# Patient Record
Sex: MALE | Race: WHITE | NOT HISPANIC OR LATINO | Employment: FULL TIME | URBAN - METROPOLITAN AREA
[De-identification: names, ages, dates, MRNs, and addresses within clinical notes are randomized per-mention and may not be internally consistent; named-entity substitution may affect disease eponyms.]

---

## 2017-08-23 ENCOUNTER — OFFICE VISIT (OUTPATIENT)
Dept: ORTHOPEDICS | Facility: CLINIC | Age: 22
End: 2017-08-23
Attending: ORTHOPAEDIC SURGERY
Payer: COMMERCIAL

## 2017-08-23 ENCOUNTER — HOSPITAL ENCOUNTER (OUTPATIENT)
Dept: RADIOLOGY | Facility: HOSPITAL | Age: 22
Discharge: HOME | End: 2017-08-23
Attending: ORTHOPAEDIC SURGERY
Payer: COMMERCIAL

## 2017-08-23 ENCOUNTER — TELEPHONE (OUTPATIENT)
Dept: ORTHOPEDICS | Facility: CLINIC | Age: 22
End: 2017-08-23

## 2017-08-23 VITALS — HEIGHT: 71 IN | BODY MASS INDEX: 44.1 KG/M2 | WEIGHT: 315 LBS

## 2017-08-23 DIAGNOSIS — M25.561 ACUTE BILATERAL KNEE PAIN: ICD-10-CM

## 2017-08-23 DIAGNOSIS — M25.562 ACUTE BILATERAL KNEE PAIN: ICD-10-CM

## 2017-08-23 DIAGNOSIS — M25.562 ACUTE BILATERAL KNEE PAIN: Primary | ICD-10-CM

## 2017-08-23 DIAGNOSIS — M25.562 ACUTE PAIN OF LEFT KNEE: Primary | ICD-10-CM

## 2017-08-23 DIAGNOSIS — M25.561 ACUTE BILATERAL KNEE PAIN: Primary | ICD-10-CM

## 2017-08-23 PROCEDURE — 73564 X-RAY EXAM KNEE 4 OR MORE: CPT | Mod: TC,50

## 2017-08-23 PROCEDURE — 73564 X-RAY EXAM KNEE 4 OR MORE: CPT | Mod: 26,50,, | Performed by: RADIOLOGY

## 2017-08-23 PROCEDURE — 99999 PR PBB SHADOW E&M-EST. PATIENT-LVL III: CPT | Mod: PBBFAC,,, | Performed by: PHYSICIAN ASSISTANT

## 2017-08-23 PROCEDURE — 3008F BODY MASS INDEX DOCD: CPT | Mod: S$GLB,,, | Performed by: PHYSICIAN ASSISTANT

## 2017-08-23 PROCEDURE — 99203 OFFICE O/P NEW LOW 30 MIN: CPT | Mod: S$GLB,,, | Performed by: PHYSICIAN ASSISTANT

## 2017-08-23 NOTE — TELEPHONE ENCOUNTER
Called both numbers to inform patient of x-rays to be completed prior to visit. Patient and patient's mother's phones were not accepting call at this time. Unable to leave message.

## 2017-08-24 NOTE — PROGRESS NOTES
SUBJECTIVE:     Chief Complaint & History of Present Illness:  Eduin Ugarte is a New patient 22 y.o. male who is seen here today with a complaint of    Chief Complaint   Patient presents with    Left Knee - Pain    . Reports he sufferd a fall and immediately developed pain and tenderness in the medial aspect of his left knee. He has had multiple episodes of locking and giving way since fall. Was seen a Urgent care x-rays demonstrate no evidence of fracture or dislocation. He does have a positive History of meniscal tear in that knee 3 years ago. And underwent arthroscopic repair  On a scale of 1-10, with 10 being worst pain imaginable, he rates this pain as 7 on good days and 10 on bad days.  he describes the pain as tender and sore.    Review of patient's allergies indicates:  No Known Allergies      Current Outpatient Prescriptions   Medication Sig Dispense Refill    hydrocodone-acetaminophen 7.5-325mg (NORCO) 7.5-325 mg per tablet Take 1 tablet by mouth every 6 (six) hours as needed for Pain.      ibuprofen (ADVIL,MOTRIN) 600 MG tablet Take 1 tablet (600 mg total) by mouth 3 (three) times daily. 60 tablet 0    oxycodone-acetaminophen (PERCOCET) 5-325 mg per tablet Take 1 tablet by mouth every 6 (six) hours as needed for Pain. 50 tablet 0    promethazine (PHENERGAN) 25 MG tablet Take 1 tablet (25 mg total) by mouth every 8 (eight) hours as needed for Nausea. 30 tablet 0     No current facility-administered medications for this visit.        Past Medical History:   Diagnosis Date    Asthma        History reviewed. No pertinent surgical history.    Vital Signs (Most Recent)  There were no vitals filed for this visit.        Review of Systems:  ROS:  Constitutional: no fever or chills  Eyes: no visual changes  ENT: no nasal congestion or sore throat  Respiratory: no cough or shortness of breath  Cardiovascular: no chest pain or palpitations  Gastrointestinal: no nausea or vomiting, tolerating  "diet  Genitourinary: no hematuria or dysuria  Integument/Breast: no rash or pruritis  Hematologic/Lymphatic: no easy bruising or lymphadenopathy  Musculoskeletal: no arthralgias or myalgias  Neurological: no seizures or tremors  Behavioral/Psych: no auditory or visual hallucinations  Endocrine: no heat or cold intolerance                OBJECTIVE:     PHYSICAL EXAM:  Height: 5' 11" (180.3 cm) Weight: (!) 158.8 kg (350 lb 1.5 oz), General Appearance: Well nourished, well developed, in no acute distress.  Neurological: Mood & affect are normal.  left Knee Exam:  Knee Range of Motion:0-100 degrees flexion   Effusion:yes  Condition of skin:intact  Location of tenderness:Medial joint line and Patella   Strength:limited by pain  Stability:  stable to testing, Lachman: stable, LCL: stable, MCL: stable and PCL: stable  Varus /Valgus stress:  normal  Hunter:   Positive Medial/Positive Medial    right Knee Exam:  Knee Range of Motion:0-120 degrees flexion   Effusion:none  Condition of skin:intact  Location of tenderness:None   Strength:5 of 5  Stability:  stable to testing  Varus /Valgus stress:  normal  Hunter:   negative/negative      Hip Examination:  normal    RADIOGRAPHS:  Taken today films reviewed by me demonstrate no fracture dilocation or advanced DJD    ASSESSMENT/PLAN:     Plan: We discussed with the patient at length all the different treatment options available for  the knee including anti-inflammatories, acetaminophen, rest, ice, knee strengthening exercise, occasional cortisone injections for temporary relief, Viscosupplimentation injections, arthroscopic debridement osteotomy, and finally knee arthroplasty.   In light of positive exam we will proceed with MRI of left knee  Knee immobilizer , crutch walking WBAT  continue pain medication prescribed and Mobic.   Follow up after MRI to discuss treatment options  "

## 2017-08-30 ENCOUNTER — HOSPITAL ENCOUNTER (OUTPATIENT)
Dept: RADIOLOGY | Facility: HOSPITAL | Age: 22
Discharge: HOME | End: 2017-08-30
Attending: PHYSICIAN ASSISTANT
Payer: COMMERCIAL

## 2017-08-30 DIAGNOSIS — M25.562 ACUTE PAIN OF LEFT KNEE: ICD-10-CM

## 2017-08-30 PROCEDURE — 73721 MRI JNT OF LWR EXTRE W/O DYE: CPT | Mod: TC,LT

## 2017-08-30 PROCEDURE — 73721 MRI JNT OF LWR EXTRE W/O DYE: CPT | Mod: 26,LT,, | Performed by: RADIOLOGY

## 2017-09-11 ENCOUNTER — OFFICE VISIT (OUTPATIENT)
Dept: ORTHOPEDICS | Facility: CLINIC | Age: 22
End: 2017-09-11
Payer: COMMERCIAL

## 2017-09-11 VITALS — WEIGHT: 315 LBS | BODY MASS INDEX: 42.66 KG/M2 | HEIGHT: 72 IN

## 2017-09-11 DIAGNOSIS — M25.562 ACUTE PAIN OF LEFT KNEE: Primary | ICD-10-CM

## 2017-09-11 PROCEDURE — 20610 DRAIN/INJ JOINT/BURSA W/O US: CPT | Mod: LT,S$GLB,, | Performed by: PHYSICIAN ASSISTANT

## 2017-09-11 PROCEDURE — 99999 PR PBB SHADOW E&M-EST. PATIENT-LVL III: CPT | Mod: PBBFAC,,, | Performed by: PHYSICIAN ASSISTANT

## 2017-09-11 PROCEDURE — 3008F BODY MASS INDEX DOCD: CPT | Mod: S$GLB,,, | Performed by: PHYSICIAN ASSISTANT

## 2017-09-11 PROCEDURE — 99213 OFFICE O/P EST LOW 20 MIN: CPT | Mod: 25,S$GLB,, | Performed by: PHYSICIAN ASSISTANT

## 2017-09-11 RX ORDER — BETAMETHASONE SODIUM PHOSPHATE AND BETAMETHASONE ACETATE 3; 3 MG/ML; MG/ML
6 INJECTION, SUSPENSION INTRA-ARTICULAR; INTRALESIONAL; INTRAMUSCULAR; SOFT TISSUE
Status: COMPLETED | OUTPATIENT
Start: 2017-09-11 | End: 2017-09-11

## 2017-09-11 RX ADMIN — BETAMETHASONE SODIUM PHOSPHATE AND BETAMETHASONE ACETATE 6 MG: 3; 3 INJECTION, SUSPENSION INTRA-ARTICULAR; INTRALESIONAL; INTRAMUSCULAR; SOFT TISSUE at 06:09

## 2017-10-16 ENCOUNTER — OFFICE VISIT (OUTPATIENT)
Dept: FAMILY MEDICINE | Facility: CLINIC | Age: 22
End: 2017-10-16
Payer: COMMERCIAL

## 2017-10-16 VITALS
BODY MASS INDEX: 44.1 KG/M2 | HEART RATE: 113 BPM | DIASTOLIC BLOOD PRESSURE: 88 MMHG | SYSTOLIC BLOOD PRESSURE: 128 MMHG | HEIGHT: 71 IN | WEIGHT: 315 LBS | TEMPERATURE: 99 F | OXYGEN SATURATION: 97 %

## 2017-10-16 DIAGNOSIS — F32.A DEPRESSION, UNSPECIFIED DEPRESSION TYPE: Primary | ICD-10-CM

## 2017-10-16 DIAGNOSIS — T74.21XA CONFIRMED VICTIM OF SEXUAL ABUSE IN ADULTHOOD, INITIAL ENCOUNTER: ICD-10-CM

## 2017-10-16 PROCEDURE — 99213 OFFICE O/P EST LOW 20 MIN: CPT | Mod: S$GLB,,, | Performed by: FAMILY MEDICINE

## 2017-10-16 RX ORDER — METHYLPREDNISOLONE 4 MG/1
TABLET ORAL
COMMUNITY
Start: 2017-10-10 | End: 2017-10-16

## 2017-10-16 RX ORDER — HYDROCODONE BITARTRATE AND ACETAMINOPHEN 5; 325 MG/1; MG/1
1 TABLET ORAL
Refills: 0 | COMMUNITY
Start: 2017-08-23 | End: 2017-10-16

## 2017-10-16 RX ORDER — ALBUTEROL SULFATE 90 UG/1
AEROSOL, METERED RESPIRATORY (INHALATION)
COMMUNITY
Start: 2017-10-10 | End: 2017-10-16

## 2017-10-16 RX ORDER — AZITHROMYCIN 250 MG/1
TABLET, FILM COATED ORAL
COMMUNITY
Start: 2017-10-10 | End: 2017-10-16

## 2017-10-16 RX ORDER — MELOXICAM 15 MG/1
15 TABLET ORAL DAILY
Refills: 0 | COMMUNITY
Start: 2017-08-23 | End: 2017-10-16

## 2017-10-16 RX ORDER — PROMETHAZINE HYDROCHLORIDE AND DEXTROMETHORPHAN HYDROBROMIDE 6.25; 15 MG/5ML; MG/5ML
SYRUP ORAL
COMMUNITY
Start: 2017-10-10 | End: 2017-10-16

## 2017-10-17 ENCOUNTER — TELEPHONE (OUTPATIENT)
Dept: FAMILY MEDICINE | Facility: CLINIC | Age: 22
End: 2017-10-17

## 2017-10-17 RX ORDER — SERTRALINE HYDROCHLORIDE 50 MG/1
50 TABLET, FILM COATED ORAL DAILY
Qty: 30 TABLET | Refills: 11 | Status: SHIPPED | OUTPATIENT
Start: 2017-10-17 | End: 2019-06-17

## 2017-10-17 NOTE — TELEPHONE ENCOUNTER
----- Message from Teresa Novoa sent at 10/17/2017  1:37 PM CDT -----  Contact: mother  Mother called requesting a prescription for zoloft to be sent to [facility].       Dr Mendiola, I am not sure if you know this pt. I don't see Zoloft on his med list, even past meds.   I have not spoken with the pt or mom  Patient saw you yesterday
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
## 2019-03-07 ENCOUNTER — TELEPHONE (OUTPATIENT)
Dept: FAMILY MEDICINE | Facility: CLINIC | Age: 24
End: 2019-03-07

## 2019-03-07 NOTE — TELEPHONE ENCOUNTER
----- Message from Marilyn Berkowitz sent at 3/7/2019  9:49 AM CST -----  Contact: [phone] /self  Patient is requesting a call back regarding having paperwork completed to return to work.  Thanks

## 2019-03-07 NOTE — TELEPHONE ENCOUNTER
Patient went to Urgent care dx with flu and was given excuse but has paperwork to be completed from work to rtn to work   Out 2/28/19 - 3/6/19  He has excuse from urgent care but they paper work has to be completed by his PCP    Could he drop off the paper work and you sign it - he said it is a simple form releasing him to rtn to work?  Please advise today

## 2019-06-17 ENCOUNTER — OFFICE VISIT (OUTPATIENT)
Dept: FAMILY MEDICINE | Facility: CLINIC | Age: 24
End: 2019-06-17
Payer: COMMERCIAL

## 2019-06-17 VITALS
HEART RATE: 72 BPM | DIASTOLIC BLOOD PRESSURE: 70 MMHG | HEIGHT: 71 IN | SYSTOLIC BLOOD PRESSURE: 108 MMHG | TEMPERATURE: 98 F | BODY MASS INDEX: 40.37 KG/M2 | OXYGEN SATURATION: 98 % | WEIGHT: 288.38 LBS

## 2019-06-17 DIAGNOSIS — Z23 NEED FOR TDAP VACCINATION: ICD-10-CM

## 2019-06-17 DIAGNOSIS — Z00.00 ROUTINE HEALTH MAINTENANCE: Primary | ICD-10-CM

## 2019-06-17 DIAGNOSIS — Z23 NEED FOR PNEUMOCOCCAL VACCINATION: ICD-10-CM

## 2019-06-17 DIAGNOSIS — F32.A DEPRESSION, UNSPECIFIED DEPRESSION TYPE: ICD-10-CM

## 2019-06-17 PROCEDURE — 90715 TDAP VACCINE GREATER THAN OR EQUAL TO 7YO IM: ICD-10-PCS | Mod: S$GLB,,, | Performed by: FAMILY MEDICINE

## 2019-06-17 PROCEDURE — 99395 PREV VISIT EST AGE 18-39: CPT | Mod: 25,S$GLB,, | Performed by: FAMILY MEDICINE

## 2019-06-17 PROCEDURE — 90732 PPSV23 VACC 2 YRS+ SUBQ/IM: CPT | Mod: S$GLB,,, | Performed by: FAMILY MEDICINE

## 2019-06-17 PROCEDURE — 90471 PNEUMOCOCCAL POLYSACCHARIDE VACCINE 23-VALENT =>2YO SQ IM: ICD-10-PCS | Mod: S$GLB,,, | Performed by: FAMILY MEDICINE

## 2019-06-17 PROCEDURE — 90715 TDAP VACCINE 7 YRS/> IM: CPT | Mod: S$GLB,,, | Performed by: FAMILY MEDICINE

## 2019-06-17 PROCEDURE — 90732 PNEUMOCOCCAL POLYSACCHARIDE VACCINE 23-VALENT =>2YO SQ IM: ICD-10-PCS | Mod: S$GLB,,, | Performed by: FAMILY MEDICINE

## 2019-06-17 PROCEDURE — 90472 IMMUNIZATION ADMIN EACH ADD: CPT | Mod: S$GLB,,, | Performed by: FAMILY MEDICINE

## 2019-06-17 PROCEDURE — 99395 PR PREVENTIVE VISIT,EST,18-39: ICD-10-PCS | Mod: 25,S$GLB,, | Performed by: FAMILY MEDICINE

## 2019-06-17 PROCEDURE — 90471 IMMUNIZATION ADMIN: CPT | Mod: S$GLB,,, | Performed by: FAMILY MEDICINE

## 2019-06-17 PROCEDURE — 90472 TDAP VACCINE GREATER THAN OR EQUAL TO 7YO IM: ICD-10-PCS | Mod: S$GLB,,, | Performed by: FAMILY MEDICINE

## 2019-06-17 RX ORDER — SERTRALINE HYDROCHLORIDE 100 MG/1
100 TABLET, FILM COATED ORAL DAILY
Qty: 90 TABLET | Refills: 3 | Status: SHIPPED | OUTPATIENT
Start: 2019-06-17 | End: 2019-07-24

## 2019-06-18 NOTE — PROGRESS NOTES
" Patient ID: Eduin Ugarte is a 24 y.o. male.    Chief Complaint: Depression    HPI      Eduin Ugarte is a 24 y.o. male. here for annual exam.   Complains of continued depressive symptoms occasionally.  Patient with pros to Magic stress disorder.  Discussed need to see counselor.  Would like to increase medication from  mg.        Review of Symptoms    Constitutional: Negative.    HENT: Negative.    Eyes: Negative.    Respiratory: Negative.    Cardiovascular: Negative.    Gastrointestinal: Negative.    Endocrine: Negative.    Genitourinary: Negative.    Musculoskeletal: Negative.    Skin: Negative.    Allergic/Immunologic: Negative.    Neurological: Negative.    Hematological: Negative.    Psychiatric/Behavioral: Negative.      Except as above in HPI      Vitals:    06/17/19 0816   BP: 108/70   BP Location: Left arm   Patient Position: Sitting   Pulse: 72   Temp: 98.2 °F (36.8 °C)   TempSrc: Oral   SpO2: 98%   Weight: 130.8 kg (288 lb 5.8 oz)   Height: 5' 11" (1.803 m)        Physical  Exam      Constitutional:  Oriented to person, place, and time. Appears well-developed and well-nourished.     HENT:   Head: Normocephalic and atraumatic.     Right Ear: Tympanic membrane, ear canal and External ear normal     Left Ear: Tympanic membrane, ear canal and External ear normal     Nose: Nose normal. No rhinorrhea or nasal deformity.     Mouth/Throat: Uvula is midline, oropharynx is clear and moist and mucous membranes are normal.      Eyes: Conjunctivae are normal. Right eye exhibits no discharge. Left eye exhibits no discharge. No scleral icterus.     Neck:  No JVD present. No tracheal deviation  []  Neck supple.   []  No Carotid bruit    Cardiovascular:  Regular rate and rhythm with normal S1 and S2     Pulmonary/Chest:   Clear to auscultation bilaterally without wheezes, rhonchi or rales    Musculoskeletal: Normal range of motion. No edema or tenderness.   No deformity     Lymphadenopathy:  No cervical " adenopathy.     Neurological:  Alert and oriented to person, place, and time. Coordination normal.     Skin: Skin is warm and dry. No rash noted.     Psychiatric: Normal mood and affect. Speech is normal and behavior is normal. Judgment and thought content normal.     Complete Blood Count  No results found for: RBC, HGB, HCT, MCV, MCH, MCHC, RDW, PLT, MPV, GRAN, LYMPH, MONO, EOS, BASO, GRAN, LYMPH, MONO, EOSINOPHIL, BASOPHIL, DIFFMETHOD    Comprehensive Metabolic Panel  No results found for: GLU, BUN, CREATININE, NA, K, CL, PROT, ALBUMIN, BILITOT, AST, ALKPHOS, CO2, ALT, ANIONGAP, EGFRNONAA, ESTGFRAFRICA    TSH  No results found for: TSH    Assessment / Plan:      ICD-10-CM ICD-9-CM   1. Routine health maintenance Z00.00 V70.0   2. Depression, unspecified depression type F32.9 311   3. Need for Tdap vaccination Z23 V06.1   4. Need for pneumococcal vaccination Z23 V03.82     Routine health maintenance  -     Comprehensive metabolic panel; Future; Expected date: 06/17/2019  -     CBC auto differential; Future; Expected date: 06/17/2019  -     Lipid panel; Future; Expected date: 06/17/2019  -     TSH; Future; Expected date: 06/17/2019    Depression, unspecified depression type    Need for Tdap vaccination  -     Tdap Vaccine    Need for pneumococcal vaccination  -     Pneumococcal Polysaccharide Vaccine (23 Valent) (SQ/IM)    Other orders  -     sertraline (ZOLOFT) 100 MG tablet; Take 1 tablet (100 mg total) by mouth once daily.  Dispense: 90 tablet; Refill: 3          Discussed how to stay healthy including: diet, exercise, refraining from smoking and discussed screening exams / tests needed for age, sex and family Hx.                      Oli Dillard M.D.

## 2019-07-19 ENCOUNTER — PATIENT MESSAGE (OUTPATIENT)
Dept: FAMILY MEDICINE | Facility: CLINIC | Age: 24
End: 2019-07-19

## 2019-07-24 ENCOUNTER — PATIENT MESSAGE (OUTPATIENT)
Dept: FAMILY MEDICINE | Facility: CLINIC | Age: 24
End: 2019-07-24

## 2019-07-24 ENCOUNTER — OFFICE VISIT (OUTPATIENT)
Dept: FAMILY MEDICINE | Facility: CLINIC | Age: 24
End: 2019-07-24
Payer: COMMERCIAL

## 2019-07-24 VITALS
DIASTOLIC BLOOD PRESSURE: 86 MMHG | SYSTOLIC BLOOD PRESSURE: 130 MMHG | HEART RATE: 88 BPM | OXYGEN SATURATION: 98 % | WEIGHT: 272.94 LBS | HEIGHT: 71 IN | BODY MASS INDEX: 38.21 KG/M2 | TEMPERATURE: 99 F

## 2019-07-24 DIAGNOSIS — F32.A DEPRESSION, UNSPECIFIED DEPRESSION TYPE: Primary | ICD-10-CM

## 2019-07-24 PROCEDURE — 99213 OFFICE O/P EST LOW 20 MIN: CPT | Mod: S$GLB,,, | Performed by: FAMILY MEDICINE

## 2019-07-24 PROCEDURE — 3008F PR BODY MASS INDEX (BMI) DOCUMENTED: ICD-10-PCS | Mod: CPTII,S$GLB,, | Performed by: FAMILY MEDICINE

## 2019-07-24 PROCEDURE — 99213 PR OFFICE/OUTPT VISIT, EST, LEVL III, 20-29 MIN: ICD-10-PCS | Mod: S$GLB,,, | Performed by: FAMILY MEDICINE

## 2019-07-24 PROCEDURE — 3008F BODY MASS INDEX DOCD: CPT | Mod: CPTII,S$GLB,, | Performed by: FAMILY MEDICINE

## 2019-07-24 RX ORDER — VENLAFAXINE HYDROCHLORIDE 75 MG/1
75 CAPSULE, EXTENDED RELEASE ORAL DAILY
Qty: 30 CAPSULE | Refills: 11 | Status: SHIPPED | OUTPATIENT
Start: 2019-07-24 | End: 2020-01-09 | Stop reason: SDUPTHER

## 2019-08-02 NOTE — PROGRESS NOTES
" Patient ID: Eduin Ugarte is a 24 y.o. male.    Chief Complaint: check up; discuss anxiety    HPI       Eduin Ugarte is a 24 y.o. male complains of continued anxiety.  Medication helped in the past but is not helping as much as it did when he 1st started the medication.  The 1st two weeks on the increased dose were helpful.  He continues to talk with counselors from the patient assistant program.        Review of Symptoms    Constitutional  No change in activity, No chills fever   Resp  Neg hemoptysis, stridor, choking  CVS  Neg chest pain, palpitations    /86   Pulse 88   Temp 98.6 °F (37 °C)   Ht 5' 11" (1.803 m)   Wt 123.8 kg (272 lb 14.9 oz)   SpO2 98%   BMI 38.07 kg/m²     Physical Exam    Vitals:    07/24/19 1608   BP: 130/86   Pulse: 88   Temp: 98.6 °F (37 °C)   SpO2: 98%   Weight: 123.8 kg (272 lb 14.9 oz)   Height: 5' 11" (1.803 m)       Constitutional:   Oriented to person, place, and time.appears well-developed and well-nourished.   No distress.  he    HENT  Head: Normocephalic and atraumatic  Right Ear: External ear normal.   Left Ear: External ear normal.   Nose: External nose normal.   Mouth: Moist mucous membranes    Eyes:   Conjunctivae are normal. Right eye exhibits no discharge. Left eye exhibits no discharge. No scleral icterus. No periorbital edema    Musculoskeletal:  No edema. No obvious deformity No wasting     Neurological:  Alert and oriented to person, place, and time. Coordination normal.     Skin:   Skin is warm and dry.  No diaphoresis.   No rash noted.     Psychiatric: Normal mood and slight decreased affect. Behavior is normal. Judgment and thought content normal.     Complete Blood Count  Lab Results   Component Value Date    RBC 5.19 06/17/2019    HGB 15.9 06/17/2019    HCT 46.6 06/17/2019    MCV 89.8 06/17/2019    MCH 30.6 06/17/2019    MCHC 34.1 06/17/2019    RDW 12.8 06/17/2019     06/17/2019    MPV 9.7 06/17/2019    LYMPH 2,323 06/17/2019    LYMPH 20.2 " 06/17/2019    MONO 805 06/17/2019    MONO 7.0 06/17/2019     06/17/2019    BASO 115 06/17/2019    EOSINOPHIL 3.9 06/17/2019    BASOPHIL 1.0 06/17/2019       Comprehensive Metabolic Panel  Lab Results   Component Value Date    GLU 86 06/17/2019    BUN 11 06/17/2019    CREATININE 0.87 06/17/2019     06/17/2019    K 4.1 06/17/2019     06/17/2019    PROT 7.1 06/17/2019    ALBUMIN 4.4 06/17/2019    BILITOT 0.4 06/17/2019    AST 13 06/17/2019    ALKPHOS 81 06/17/2019    CO2 27 06/17/2019    ALT 20 06/17/2019    EGFRNONAA 120 06/17/2019    ESTGFRAFRICA 139 06/17/2019       TSH  Lab Results   Component Value Date    TSH 1.63 06/17/2019       Assessment / Plan:      ICD-10-CM ICD-9-CM   1. Depression, unspecified depression type F32.9 311     Depression, unspecified depression type  Comments:  No suicidal homicidal ideations-discussed plan of action if this occurs.    Other orders  -     venlafaxine (EFFEXOR-XR) 75 MG 24 hr capsule; Take 1 capsule (75 mg total) by mouth once daily.  Dispense: 30 capsule; Refill: 11

## 2019-12-19 LAB
ALBUMIN SERPL-MCNC: 4.4 G/DL (ref 3.6–5.1)
ALBUMIN/GLOB SERPL: 1.6 (CALC) (ref 1–2.5)
ALP SERPL-CCNC: 81 U/L (ref 40–115)
ALT SERPL-CCNC: 20 U/L (ref 9–46)
AST SERPL-CCNC: 13 U/L (ref 10–40)
BASOPHILS # BLD AUTO: 115 CELLS/UL (ref 0–200)
BASOPHILS NFR BLD AUTO: 1 %
BILIRUB SERPL-MCNC: 0.4 MG/DL (ref 0.2–1.2)
BUN SERPL-MCNC: 11 MG/DL (ref 7–25)
BUN/CREAT SERPL: NORMAL (CALC) (ref 6–22)
CALCIUM SERPL-MCNC: 9.2 MG/DL (ref 8.6–10.3)
CHLORIDE SERPL-SCNC: 100 MMOL/L (ref 98–110)
CHOLEST SERPL-MCNC: 129 MG/DL
CHOLEST/HDLC SERPL: 3.9 (CALC)
CO2 SERPL-SCNC: 27 MMOL/L (ref 20–32)
CREAT SERPL-MCNC: 0.87 MG/DL (ref 0.6–1.35)
EOSINOPHIL # BLD AUTO: 449 CELLS/UL (ref 15–500)
EOSINOPHIL NFR BLD AUTO: 3.9 %
ERYTHROCYTE [DISTWIDTH] IN BLOOD BY AUTOMATED COUNT: 12.8 % (ref 11–15)
GFRSERPLBLD MDRD-ARVRAT: 120 ML/MIN/1.73M2
GLOBULIN SER CALC-MCNC: 2.7 G/DL (CALC) (ref 1.9–3.7)
GLUCOSE SERPL-MCNC: 86 MG/DL (ref 65–99)
HCT VFR BLD AUTO: 46.6 % (ref 38.5–50)
HDLC SERPL-MCNC: 33 MG/DL
HGB BLD-MCNC: 15.9 G/DL (ref 13.2–17.1)
LDLC SERPL CALC-MCNC: 81 MG/DL (CALC)
LYMPHOCYTES # BLD AUTO: 2323 CELLS/UL (ref 850–3900)
LYMPHOCYTES NFR BLD AUTO: 20.2 %
MCH RBC QN AUTO: 30.6 PG (ref 27–33)
MCHC RBC AUTO-ENTMCNC: 34.1 G/DL (ref 32–36)
MCV RBC AUTO: 89.8 FL (ref 80–100)
MONOCYTES # BLD AUTO: 805 CELLS/UL (ref 200–950)
MONOCYTES NFR BLD AUTO: 7 %
NEUTROPHILS # BLD AUTO: 7809 CELLS/UL (ref 1500–7800)
NEUTROPHILS NFR BLD AUTO: 67.9 %
NONHDLC SERPL-MCNC: 96 MG/DL (CALC)
PLATELET # BLD AUTO: 241 THOUSAND/UL (ref 140–400)
PMV BLD REES-ECKER: 9.7 FL (ref 7.5–12.5)
POTASSIUM SERPL-SCNC: 4.1 MMOL/L (ref 3.5–5.3)
PROT SERPL-MCNC: 7.1 G/DL (ref 6.1–8.1)
RBC # BLD AUTO: 5.19 MILLION/UL (ref 4.2–5.8)
SODIUM SERPL-SCNC: 136 MMOL/L (ref 135–146)
TRIGL SERPL-MCNC: 73 MG/DL
TSH SERPL-ACNC: 1.63 MIU/L (ref 0.4–4.5)
WBC # BLD AUTO: 11.5 THOUSAND/UL (ref 3.8–10.8)

## 2020-01-06 ENCOUNTER — PATIENT MESSAGE (OUTPATIENT)
Dept: FAMILY MEDICINE | Facility: CLINIC | Age: 25
End: 2020-01-06

## 2020-01-09 ENCOUNTER — OFFICE VISIT (OUTPATIENT)
Dept: FAMILY MEDICINE | Facility: CLINIC | Age: 25
End: 2020-01-09
Payer: COMMERCIAL

## 2020-01-09 VITALS
DIASTOLIC BLOOD PRESSURE: 72 MMHG | OXYGEN SATURATION: 98 % | BODY MASS INDEX: 34.98 KG/M2 | HEIGHT: 71 IN | TEMPERATURE: 98 F | HEART RATE: 90 BPM | WEIGHT: 249.88 LBS | SYSTOLIC BLOOD PRESSURE: 110 MMHG

## 2020-01-09 DIAGNOSIS — F32.A DEPRESSION, UNSPECIFIED DEPRESSION TYPE: Primary | ICD-10-CM

## 2020-01-09 PROCEDURE — 99213 OFFICE O/P EST LOW 20 MIN: CPT | Mod: S$GLB,,, | Performed by: FAMILY MEDICINE

## 2020-01-09 PROCEDURE — 3008F BODY MASS INDEX DOCD: CPT | Mod: CPTII,S$GLB,, | Performed by: FAMILY MEDICINE

## 2020-01-09 PROCEDURE — 99213 PR OFFICE/OUTPT VISIT, EST, LEVL III, 20-29 MIN: ICD-10-PCS | Mod: S$GLB,,, | Performed by: FAMILY MEDICINE

## 2020-01-09 PROCEDURE — 3008F PR BODY MASS INDEX (BMI) DOCUMENTED: ICD-10-PCS | Mod: CPTII,S$GLB,, | Performed by: FAMILY MEDICINE

## 2020-01-09 RX ORDER — BUSPIRONE HYDROCHLORIDE 5 MG/1
5 TABLET ORAL 2 TIMES DAILY
Qty: 60 TABLET | Refills: 0 | Status: SHIPPED | OUTPATIENT
Start: 2020-01-09 | End: 2020-01-31

## 2020-01-09 RX ORDER — VENLAFAXINE HYDROCHLORIDE 150 MG/1
150 CAPSULE, EXTENDED RELEASE ORAL DAILY
Qty: 30 CAPSULE | Refills: 11 | Status: SHIPPED | OUTPATIENT
Start: 2020-01-09 | End: 2020-02-18 | Stop reason: SDUPTHER

## 2020-01-20 NOTE — PROGRESS NOTES
" Patient ID: Eduin Ugarte is a 24 y.o. male.    Chief Complaint: Discuss anxiety medication and Fill out work forms    HPI       Eduin Ugarte is a 24 y.o. male here because of overwhelming anxiety.  Patient with anxiety and lashing out at work.  Had somewhat of a meltdown.  At this point better.  Previous medication that working as well as it was at 1st.  No new social or home problems at this time.  No suicidal ideation homicidal ideation or deep depression.  Mild depression symptoms.      Review of Symptoms    Constitutional  No change in activity, No chills fever   Resp  Neg hemoptysis, stridor, choking  CVS  Neg chest pain, palpitations    /72   Pulse 90   Temp 98.4 °F (36.9 °C)   Ht 5' 11" (1.803 m)   Wt 113.3 kg (249 lb 14.3 oz)   SpO2 98%   BMI 34.85 kg/m²     Physical Exam    Vitals:    01/09/20 1132   BP: 110/72   Pulse: 90   Temp: 98.4 °F (36.9 °C)   SpO2: 98%   Weight: 113.3 kg (249 lb 14.3 oz)   Height: 5' 11" (1.803 m)       Constitutional:   Oriented to person, place, and time.appears well-developed and well-nourished.   No distress.      HENT  Head: Normocephalic and atraumatic  Right Ear: External ear normal.   Left Ear: External ear normal.   Nose: External nose normal.   Mouth: Moist mucous membranes    Eyes:   Conjunctivae are normal. Right eye exhibits no discharge. Left eye exhibits no discharge. No scleral icterus. No periorbital edema    Musculoskeletal:  No edema. No obvious deformity No wasting     Neurological:  Alert and oriented to person, place, and time. Coordination normal.     Skin:   Skin is warm and dry.  No diaphoresis.   No rash noted.     Psychiatric: Normal mood and affect. Behavior is normal. Judgment and thought content normal.     Complete Blood Count  Lab Results   Component Value Date    RBC 5.19 06/17/2019    HGB 15.9 06/17/2019    HCT 46.6 06/17/2019    MCV 89.8 06/17/2019    MCH 30.6 06/17/2019    MCHC 34.1 06/17/2019    RDW 12.8 06/17/2019     " 06/17/2019    MPV 9.7 06/17/2019    LYMPH 2,323 06/17/2019    LYMPH 20.2 06/17/2019    MONO 805 06/17/2019    MONO 7.0 06/17/2019     06/17/2019    BASO 115 06/17/2019    EOSINOPHIL 3.9 06/17/2019    BASOPHIL 1.0 06/17/2019       Comprehensive Metabolic Panel  No results found for: GLU, BUN, CREATININE, NA, K, CL, PROT, ALBUMIN, BILITOT, AST, ALKPHOS, CO2, ALT, ANIONGAP, EGFRNONAA, ESTGFRAFRICA    TSH  No results found for: TSH    Assessment / Plan:      ICD-10-CM ICD-9-CM   1. Depression, unspecified depression type F32.9 311     Depression, unspecified depression type  Comments:  Discussed depression anxiety-medication change    Other orders  -     venlafaxine (EFFEXOR-XR) 150 MG Cp24; Take 1 capsule (150 mg total) by mouth once daily.  Dispense: 30 capsule; Refill: 11  -     busPIRone (BUSPAR) 5 MG Tab; Take 1 tablet (5 mg total) by mouth 2 (two) times daily. As needed for situational anxiety  Dispense: 60 tablet; Refill: 0     Discussed adding BuSpar to take as needed anxiety try to steer away from benzodiazepine

## 2020-01-31 RX ORDER — BUSPIRONE HYDROCHLORIDE 5 MG/1
TABLET ORAL
Qty: 60 TABLET | Refills: 0 | Status: SHIPPED | OUTPATIENT
Start: 2020-01-31 | End: 2020-02-18 | Stop reason: SDUPTHER

## 2020-02-18 ENCOUNTER — OFFICE VISIT (OUTPATIENT)
Dept: FAMILY MEDICINE | Facility: CLINIC | Age: 25
End: 2020-02-18
Payer: COMMERCIAL

## 2020-02-18 VITALS
HEART RATE: 108 BPM | WEIGHT: 240.88 LBS | BODY MASS INDEX: 33.72 KG/M2 | SYSTOLIC BLOOD PRESSURE: 104 MMHG | TEMPERATURE: 98 F | HEIGHT: 71 IN | DIASTOLIC BLOOD PRESSURE: 70 MMHG | OXYGEN SATURATION: 97 %

## 2020-02-18 DIAGNOSIS — F32.A DEPRESSION, UNSPECIFIED DEPRESSION TYPE: Primary | ICD-10-CM

## 2020-02-18 PROCEDURE — 3008F PR BODY MASS INDEX (BMI) DOCUMENTED: ICD-10-PCS | Mod: CPTII,S$GLB,, | Performed by: FAMILY MEDICINE

## 2020-02-18 PROCEDURE — 99213 OFFICE O/P EST LOW 20 MIN: CPT | Mod: S$GLB,,, | Performed by: FAMILY MEDICINE

## 2020-02-18 PROCEDURE — 3008F BODY MASS INDEX DOCD: CPT | Mod: CPTII,S$GLB,, | Performed by: FAMILY MEDICINE

## 2020-02-18 PROCEDURE — 99213 PR OFFICE/OUTPT VISIT, EST, LEVL III, 20-29 MIN: ICD-10-PCS | Mod: S$GLB,,, | Performed by: FAMILY MEDICINE

## 2020-02-18 RX ORDER — BUSPIRONE HYDROCHLORIDE 10 MG/1
10 TABLET ORAL 2 TIMES DAILY
Qty: 60 TABLET | Refills: 1 | Status: SHIPPED | OUTPATIENT
Start: 2020-02-18 | End: 2020-03-03

## 2020-02-18 RX ORDER — VENLAFAXINE HYDROCHLORIDE 150 MG/1
300 CAPSULE, EXTENDED RELEASE ORAL DAILY
Qty: 60 CAPSULE | Refills: 11 | Status: SHIPPED | OUTPATIENT
Start: 2020-02-18 | End: 2022-03-30 | Stop reason: SDUPTHER

## 2020-02-18 NOTE — PROGRESS NOTES
" Patient ID: Eduin Ugarte is a 24 y.o. male.    Chief Complaint: Anxiety and Follow-up    HPI       Eduin Ugarte is a 24 y.o. male following up on anxiety.  Patient was doing okay on current medications however episode at work causes increased anxiety.  Started taking to Effexor and sometimes taking 10 milligrams of BuSpar.  Has not gotten counseling session through the Employee assistance program.      Review of Symptoms    Constitutional  No change in activity, No chills fever   Resp  Neg hemoptysis, stridor, choking  CVS  Neg chest pain, palpitations    /70   Pulse 108   Temp 98.3 °F (36.8 °C)   Ht 5' 11" (1.803 m)   Wt 109.2 kg (240 lb 13.6 oz)   SpO2 97%   BMI 33.59 kg/m²     Physical Exam    Vitals:    02/18/20 0903   BP: 104/70   Pulse: 108   Temp: 98.3 °F (36.8 °C)   SpO2: 97%   Weight: 109.2 kg (240 lb 13.6 oz)   Height: 5' 11" (1.803 m)       Constitutional:   Oriented to person, place, and time.appears well-developed and well-nourished.   No distress.      HENT  Head: Normocephalic and atraumatic  Right Ear: External ear normal.   Left Ear: External ear normal.   Nose: External nose normal.   Mouth: Moist mucous membranes    Eyes:   Conjunctivae are normal. Right eye exhibits no discharge. Left eye exhibits no discharge. No scleral icterus. No periorbital edema    Musculoskeletal:  No edema. No obvious deformity No wasting     Neurological:  Alert and oriented to person, place, and time. Coordination normal.     Skin:   Skin is warm and dry.  No diaphoresis.   No rash noted.     Psychiatric: Normal mood and affect. Behavior is normal. Judgment and thought content normal.     Complete Blood Count  Lab Results   Component Value Date    RBC 5.19 06/17/2019    HGB 15.9 06/17/2019    HCT 46.6 06/17/2019    MCV 89.8 06/17/2019    MCH 30.6 06/17/2019    MCHC 34.1 06/17/2019    RDW 12.8 06/17/2019     06/17/2019    MPV 9.7 06/17/2019    LYMPH 2,323 06/17/2019    LYMPH 20.2 06/17/2019    " MONO 805 06/17/2019    MONO 7.0 06/17/2019     06/17/2019    BASO 115 06/17/2019    EOSINOPHIL 3.9 06/17/2019    BASOPHIL 1.0 06/17/2019       Comprehensive Metabolic Panel  No results found for: GLU, BUN, CREATININE, NA, K, CL, PROT, ALBUMIN, BILITOT, AST, ALKPHOS, CO2, ALT, ANIONGAP, EGFRNONAA, ESTGFRAFRICA    TSH  No results found for: TSH    Assessment / Plan:    No diagnosis found.  There are no diagnoses linked to this encounter.

## 2020-02-25 RX ORDER — BUSPIRONE HYDROCHLORIDE 5 MG/1
TABLET ORAL
Qty: 60 TABLET | Refills: 0 | OUTPATIENT
Start: 2020-02-25

## 2020-03-03 DIAGNOSIS — F32.A DEPRESSION, UNSPECIFIED DEPRESSION TYPE: Primary | ICD-10-CM

## 2020-03-03 RX ORDER — DIVALPROEX SODIUM 125 MG/1
125 CAPSULE, COATED PELLETS ORAL 2 TIMES DAILY
Qty: 60 CAPSULE | Refills: 1 | Status: SHIPPED | OUTPATIENT
Start: 2020-03-03 | End: 2020-03-29

## 2020-03-20 RX ORDER — BUSPIRONE HYDROCHLORIDE 10 MG/1
TABLET ORAL
Qty: 60 TABLET | Refills: 1 | Status: SHIPPED | OUTPATIENT
Start: 2020-03-20 | End: 2020-04-24

## 2020-03-27 ENCOUNTER — PATIENT MESSAGE (OUTPATIENT)
Dept: FAMILY MEDICINE | Facility: CLINIC | Age: 25
End: 2020-03-27

## 2020-03-29 RX ORDER — DIVALPROEX SODIUM 125 MG/1
125 CAPSULE, COATED PELLETS ORAL 2 TIMES DAILY
Qty: 60 CAPSULE | Refills: 1 | Status: SHIPPED | OUTPATIENT
Start: 2020-03-29 | End: 2020-04-24

## 2020-04-24 RX ORDER — BUSPIRONE HYDROCHLORIDE 10 MG/1
TABLET ORAL
Qty: 60 TABLET | Refills: 1 | Status: SHIPPED | OUTPATIENT
Start: 2020-04-24 | End: 2020-05-21

## 2020-04-24 RX ORDER — DIVALPROEX SODIUM 125 MG/1
CAPSULE, COATED PELLETS ORAL
Qty: 60 CAPSULE | Refills: 1 | Status: SHIPPED | OUTPATIENT
Start: 2020-04-24 | End: 2020-05-21

## 2020-05-19 ENCOUNTER — OFFICE VISIT (OUTPATIENT)
Dept: FAMILY MEDICINE | Facility: CLINIC | Age: 25
End: 2020-05-19
Payer: COMMERCIAL

## 2020-05-19 VITALS
TEMPERATURE: 98 F | BODY MASS INDEX: 35.39 KG/M2 | OXYGEN SATURATION: 96 % | HEIGHT: 71 IN | DIASTOLIC BLOOD PRESSURE: 70 MMHG | HEART RATE: 70 BPM | SYSTOLIC BLOOD PRESSURE: 114 MMHG | WEIGHT: 252.75 LBS

## 2020-05-19 DIAGNOSIS — F32.A DEPRESSION, UNSPECIFIED DEPRESSION TYPE: Primary | ICD-10-CM

## 2020-05-19 PROCEDURE — 99213 OFFICE O/P EST LOW 20 MIN: CPT | Mod: S$GLB,,, | Performed by: FAMILY MEDICINE

## 2020-05-19 PROCEDURE — 3008F BODY MASS INDEX DOCD: CPT | Mod: CPTII,S$GLB,, | Performed by: FAMILY MEDICINE

## 2020-05-19 PROCEDURE — 99213 PR OFFICE/OUTPT VISIT, EST, LEVL III, 20-29 MIN: ICD-10-PCS | Mod: S$GLB,,, | Performed by: FAMILY MEDICINE

## 2020-05-19 PROCEDURE — 3008F PR BODY MASS INDEX (BMI) DOCUMENTED: ICD-10-PCS | Mod: CPTII,S$GLB,, | Performed by: FAMILY MEDICINE

## 2020-05-21 RX ORDER — BUSPIRONE HYDROCHLORIDE 10 MG/1
TABLET ORAL
Qty: 60 TABLET | Refills: 1 | Status: SHIPPED | OUTPATIENT
Start: 2020-05-21 | End: 2020-06-20

## 2020-05-21 RX ORDER — DIVALPROEX SODIUM 125 MG/1
CAPSULE, COATED PELLETS ORAL
Qty: 60 CAPSULE | Refills: 1 | Status: SHIPPED | OUTPATIENT
Start: 2020-05-21 | End: 2020-06-20

## 2020-05-26 NOTE — PROGRESS NOTES
" Patient ID: Eduin Ugarte is a 25 y.o. male.    Chief Complaint: 1 month f/u    HPI       Eduin Ugarte is a 25 y.o. male patient with significant anxiety and depression.  Discussed the use of medication which helps a little bit.  However discussed home-this probably source of muscles problems.  Living at with-can afford to move out-having difficulty his relating at this time.      Review of Symptoms    Constitutional  No change in activity, No chills fever   Resp  Neg hemoptysis, stridor, choking  CVS  Neg chest pain, palpitations    /70   Pulse 70   Temp 98.2 °F (36.8 °C)   Ht 5' 11" (1.803 m)   Wt 114.7 kg (252 lb 12.1 oz)   SpO2 96%   BMI 35.25 kg/m²     Physical Exam    Vitals:    05/19/20 1358   BP: 114/70   Pulse: 70   Temp: 98.2 °F (36.8 °C)   SpO2: 96%   Weight: 114.7 kg (252 lb 12.1 oz)   Height: 5' 11" (1.803 m)       Constitutional:   Oriented to person, place, and time.appears well-developed and well-nourished.   No distress.      HENT  Head: Normocephalic and atraumatic  Right Ear: External ear normal.   Left Ear: External ear normal.   Nose: External nose normal.   Mouth: Moist mucous membranes    Eyes:   Conjunctivae are normal. Right eye exhibits no discharge. Left eye exhibits no discharge. No scleral icterus. No periorbital edema    Musculoskeletal:  No edema. No obvious deformity No wasting     Neurological:  Alert and oriented to person, place, and time. Coordination normal.     Skin:   Skin is warm and dry.  No diaphoresis.   No rash noted.     Psychiatric: Normal mood and affect. Behavior is normal. Judgment and thought content normal.     Complete Blood Count  Lab Results   Component Value Date    RBC 5.19 06/17/2019    HGB 15.9 06/17/2019    HCT 46.6 06/17/2019    MCV 89.8 06/17/2019    MCH 30.6 06/17/2019    MCHC 34.1 06/17/2019    RDW 12.8 06/17/2019     06/17/2019    MPV 9.7 06/17/2019    LYMPH 2,323 06/17/2019    LYMPH 20.2 06/17/2019    MONO 805 06/17/2019    " MONO 7.0 06/17/2019     06/17/2019    BASO 115 06/17/2019    EOSINOPHIL 3.9 06/17/2019    BASOPHIL 1.0 06/17/2019       Comprehensive Metabolic Panel  No results found for: GLU, BUN, CREATININE, NA, K, CL, PROT, ALBUMIN, BILITOT, AST, ALKPHOS, CO2, ALT, ANIONGAP, EGFRNONAA, ESTGFRAFRICA    TSH  No results found for: TSH    Assessment / Plan:      ICD-10-CM ICD-9-CM   1. Depression, unspecified depression type F32.9 311     Depression, unspecified depression type    -no changes in his-try to resolve home situation-does he need to move out?

## 2020-06-20 RX ORDER — DIVALPROEX SODIUM 125 MG/1
CAPSULE, COATED PELLETS ORAL
Qty: 60 CAPSULE | Refills: 1 | Status: SHIPPED | OUTPATIENT
Start: 2020-06-20 | End: 2022-03-30

## 2020-06-20 RX ORDER — BUSPIRONE HYDROCHLORIDE 10 MG/1
TABLET ORAL
Qty: 60 TABLET | Refills: 1 | Status: SHIPPED | OUTPATIENT
Start: 2020-06-20 | End: 2022-03-30 | Stop reason: SDUPTHER

## 2021-07-06 ENCOUNTER — PATIENT MESSAGE (OUTPATIENT)
Dept: ADMINISTRATIVE | Facility: HOSPITAL | Age: 26
End: 2021-07-06

## 2021-10-04 ENCOUNTER — PATIENT MESSAGE (OUTPATIENT)
Dept: FAMILY MEDICINE | Facility: CLINIC | Age: 26
End: 2021-10-04

## 2022-01-10 ENCOUNTER — PATIENT MESSAGE (OUTPATIENT)
Dept: ADMINISTRATIVE | Facility: HOSPITAL | Age: 27
End: 2022-01-10
Payer: COMMERCIAL

## 2022-03-29 ENCOUNTER — HOSPITAL ENCOUNTER (EMERGENCY)
Facility: HOSPITAL | Age: 27
Discharge: HOME | End: 2022-03-29
Attending: EMERGENCY MEDICINE

## 2022-03-29 ENCOUNTER — TELEPHONE (OUTPATIENT)
Dept: FAMILY MEDICINE | Facility: CLINIC | Age: 27
End: 2022-03-29

## 2022-03-29 VITALS
HEIGHT: 72 IN | WEIGHT: 266.44 LBS | HEART RATE: 82 BPM | OXYGEN SATURATION: 95 % | SYSTOLIC BLOOD PRESSURE: 132 MMHG | DIASTOLIC BLOOD PRESSURE: 80 MMHG | RESPIRATION RATE: 18 BRPM | TEMPERATURE: 98 F | BODY MASS INDEX: 36.09 KG/M2

## 2022-03-29 DIAGNOSIS — R03.0 ELEVATED BP WITHOUT DIAGNOSIS OF HYPERTENSION: ICD-10-CM

## 2022-03-29 DIAGNOSIS — F41.0 PANIC ATTACK: Primary | ICD-10-CM

## 2022-03-29 PROCEDURE — 63600175 PHARM REV CODE 636 W HCPCS: Performed by: EMERGENCY MEDICINE

## 2022-03-29 PROCEDURE — 96372 THER/PROPH/DIAG INJ SC/IM: CPT | Performed by: EMERGENCY MEDICINE

## 2022-03-29 PROCEDURE — 99284 EMERGENCY DEPT VISIT MOD MDM: CPT | Mod: 25

## 2022-03-29 RX ORDER — DIAZEPAM 2 MG/1
2 TABLET ORAL EVERY 12 HOURS PRN
Qty: 10 TABLET | Refills: 0 | Status: SHIPPED | OUTPATIENT
Start: 2022-03-29 | End: 2022-03-30 | Stop reason: SDUPTHER

## 2022-03-29 RX ORDER — DIAZEPAM 10 MG/2ML
5 INJECTION INTRAMUSCULAR
Status: COMPLETED | OUTPATIENT
Start: 2022-03-29 | End: 2022-03-29

## 2022-03-29 RX ADMIN — DIAZEPAM 5 MG: 5 INJECTION, SOLUTION INTRAMUSCULAR; INTRAVENOUS at 01:03

## 2022-03-29 NOTE — DISCHARGE INSTRUCTIONS
Additional instructions  Followup with your primary care physician. Follow up with mental health, see follow up section.  Take all your medications as prescribed. Return to the emergency department if you have increasing pain, chest pain, difficulty breathing,  nonstop vomiting, or any other concerns. Be sure to drink plenty of fluids to stay hydrated. Get plenty of rest. Please refer to additional educational material for further instructions.

## 2022-03-29 NOTE — TELEPHONE ENCOUNTER
----- Message from Yanelis Montoya sent at 3/29/2022 11:58 AM CDT -----  Type:  Same Day Appointment Request    Caller is requesting a same day appointment.  Caller declined first available appointment listed below.    Name of Caller: Pt   When is the first available appointment? 05/02  Symptoms: anxiety, depression, panic attacks   Best Call Back Number:[phone]  Additional Information:  would like to be seen on tomorrow if possible

## 2022-03-29 NOTE — ED PROVIDER NOTES
SCRIBE #1 NOTE: I, José Zarate, am scribing for, and in the presence of, Dr. Melisa Orellana.     I, Dr. Melisa Orellana MD, personally performed the services described in this documentation. All medical record entries made by the scribe were at my direction and in my presence.  I have reviewed the chart and agree that the record reflects my personal performance and is accurate and complete. Melisa Orellana MD.    History of Present Illness:    Eduin Ugarte 26 y.o. with a  has a past medical history of Asthma. who presents to the emergency department today with a complaint of a panic attack that occurs intermittently for the past week. He reports having an increased stress level due to a new job.  Associated symptoms include abdominal pain, nausea, vomiting, diaphoresis, and trouble sleeping when the episodes come on. He denies any chest pain, shortness of breath, fever, diarrhea, self-injury, or homicide. He denies any recent deaths. Patient notes he used to be on medications for symptoms, however stopped using it due to no relief.     ROS    Constitutional: Positive diaphoresis. No fever, no chills.  Eyes: No discharge. No pain.  HENT: No nasal drainage. No ear ache. No sore throat.  Cardiovascular: No chest pain, no palpitations.  Respiratory: No cough, no shortness of breath.  Gastrointestinal: Positive nausea, vomiting, and abdominal pain. No diarrhea.  Genitourinary: No hematuria, dysuria, urgency.  Musculoskeletal: No back pain.   Skin: No rashes, no lesions.  Neurological: No headache, no focal weakness.  Psychological: Positive trouble sleeping. Positive anxious. No self-injury or HI.     Otherwise remaining ROS negative     The history is provided by the patient      ALLERGIES REVIEWED  MEDICATIONS REVIEWED  PMH/PSH/SOC/FH REVIEWED :  Eduin Ugarte  has a past medical history of Asthma. and   has no past surgical history on file. with  reports that he has been smoking. He has a 0.50 pack-year  smoking history. He has never used smokeless tobacco. He reports that he does not drink alcohol and does not use drugs. and a family history includes Arthritis in his father; Cancer in his maternal grandmother and paternal grandmother; Diabetes in his maternal grandfather; Heart disease in his maternal grandfather; Hypertension in his father.      Nursing/Ancillary staff note reviewed.  VS reviewed         Physical Exam     /80   Pulse 82   Temp 98 °F (36.7 °C) (Oral)   Resp 18   Ht 6' (1.829 m)   Wt 120.8 kg (266 lb 6.8 oz)   SpO2 95%   BMI 36.13 kg/m²     Physical Exam  Vitals and nursing note reviewed.   Constitutional:       General: He is not in acute distress.     Appearance: He is well-developed.   HENT:      Head: Normocephalic and atraumatic.      Right Ear: External ear normal.      Left Ear: External ear normal.   Eyes:      General:         Right eye: No discharge.         Left eye: No discharge.      Conjunctiva/sclera: Conjunctivae normal.   Cardiovascular:      Rate and Rhythm: Normal rate.   Pulmonary:      Effort: Pulmonary effort is normal. No respiratory distress.   Abdominal:      General: There is no distension.   Musculoskeletal:         General: Normal range of motion.      Cervical back: Normal range of motion.   Skin:     General: Skin is warm and dry.   Neurological:      Mental Status: He is alert and oriented to person, place, and time.      Cranial Nerves: No cranial nerve deficit.      Motor: No abnormal muscle tone.   Psychiatric:         Attention and Perception: Attention normal.         Mood and Affect: Mood is anxious.         Speech: Speech normal.         Behavior: Behavior normal.         Thought Content: Thought content does not include homicidal or suicidal ideation.         Judgment: Judgment normal.             DIFFERENTIAL DIAGNOSIS: After history and physical exam a differential diagnosis was considered, but was not limited to, panic attack, anxiety        ED  Course     Medications   diazePAM injection 5 mg (5 mg Intramuscular Given 3/29/22 0154)                     Medical Decision Making:   History:   I obtained history from:  The patient  Old Medical Records: I decided to obtain old medical records.       ED Management:  Eduin Ugarte  presents to the emergency Department today with anxiety/panic attack, he feels much better after meds here in the ED. He is feeling improved. I will discharge home with meds, follow up with mental health and PCP.  The pt had an elevated blood pressure here in the emergency department.  While this could be causing many different things such as pain and even just being in the emergency department the patient understands the need to follow-up with his PCP for further management.  They will need to follow up with their PCP for further evaluation and management in 2-3 days.  The pt is comfortable with this plan and comfortable going home at this time. After taking into careful account the historical factors and physical exam findings of the patient's presentation today, in conjunction with the empirical and objective data obtained on ED workup, no acute emergent medical condition requiring admission has been identified. The patient appears to be low risk for an emergent medical condition and I feel it is safe and appropriate at this time for the patient to be discharged to follow-up as detailed in their discharge instructions for reevaluation and possible continued outpatient workup and management. Regardless, an unremarkable evaluation in the ED does not preclude the development or presence of a serious or life threatening condition. As such, patient was instructed to return immediately for any worsening or change in current symptoms. Precautions for return discussed at length.  Discharge and follow-up instructions discussed with the patient who expressed understanding and willingness to comply with my recommendations.    Voice  recognition software utilized in this note.              Impression      The primary encounter diagnosis was Panic attack. A diagnosis of Elevated BP without diagnosis of hypertension was also pertinent to this visit.                Discharge Medication List as of 3/29/2022  2:25 AM      START taking these medications    Details   diazePAM (VALIUM) 2 MG tablet Take 1 tablet (2 mg total) by mouth every 12 (twelve) hours as needed for Anxiety., Starting Tue 3/29/2022, Until Thu 4/28/2022 at 2359, Normal              Follow-up Information     Schedule an appointment as soon as possible for a visit  with Oli Mendiola MD.    Specialty: Family Medicine  Contact information:  [address] [place] [address]  [phone]             [facility].    Specialties: Child and Adolescent Psychiatry, Psychology, Psychiatry  Contact information:  [address]  [address] [place] [address]  [phone]             [facility].    Contact information:  [address]  [address]  [facility] [address]                                Melisa Orellana MD  03/29/22 0524

## 2022-03-30 ENCOUNTER — PATIENT OUTREACH (OUTPATIENT)
Dept: ADMINISTRATIVE | Facility: OTHER | Age: 27
End: 2022-03-30

## 2022-03-30 ENCOUNTER — OFFICE VISIT (OUTPATIENT)
Dept: FAMILY MEDICINE | Facility: CLINIC | Age: 27
End: 2022-03-30

## 2022-03-30 VITALS
BODY MASS INDEX: 36.28 KG/M2 | WEIGHT: 267.88 LBS | OXYGEN SATURATION: 95 % | HEART RATE: 98 BPM | HEIGHT: 72 IN | SYSTOLIC BLOOD PRESSURE: 122 MMHG | TEMPERATURE: 98 F | DIASTOLIC BLOOD PRESSURE: 72 MMHG

## 2022-03-30 DIAGNOSIS — F32.A DEPRESSION, UNSPECIFIED DEPRESSION TYPE: Primary | ICD-10-CM

## 2022-03-30 PROCEDURE — 99212 PR OFFICE/OUTPT VISIT, EST, LEVL II, 10-19 MIN: ICD-10-PCS | Mod: S$GLB,,, | Performed by: FAMILY MEDICINE

## 2022-03-30 PROCEDURE — 99212 OFFICE O/P EST SF 10 MIN: CPT | Mod: S$GLB,,, | Performed by: FAMILY MEDICINE

## 2022-03-30 RX ORDER — BUSPIRONE HYDROCHLORIDE 10 MG/1
10 TABLET ORAL 2 TIMES DAILY
Qty: 60 TABLET | Refills: 1 | Status: SHIPPED | OUTPATIENT
Start: 2022-03-30 | End: 2022-04-21

## 2022-03-30 RX ORDER — MONTELUKAST SODIUM 10 MG/1
10 TABLET ORAL NIGHTLY
Qty: 30 TABLET | Refills: 0 | Status: SHIPPED | OUTPATIENT
Start: 2022-03-30 | End: 2022-04-21

## 2022-03-30 RX ORDER — DIAZEPAM 5 MG/1
5 TABLET ORAL EVERY 12 HOURS PRN
Qty: 15 TABLET | Refills: 0 | Status: SHIPPED | OUTPATIENT
Start: 2022-03-30 | End: 2022-04-20 | Stop reason: SDUPTHER

## 2022-03-30 RX ORDER — ALBUTEROL SULFATE 90 UG/1
2 AEROSOL, METERED RESPIRATORY (INHALATION) EVERY 6 HOURS PRN
Qty: 18 G | Refills: 1 | OUTPATIENT
Start: 2022-03-30 | End: 2024-01-12

## 2022-03-30 RX ORDER — VENLAFAXINE HYDROCHLORIDE 150 MG/1
300 CAPSULE, EXTENDED RELEASE ORAL DAILY
Qty: 60 CAPSULE | Refills: 11 | Status: SHIPPED | OUTPATIENT
Start: 2022-03-30 | End: 2022-08-27 | Stop reason: CLARIF

## 2022-03-30 NOTE — PROGRESS NOTES
CHW - Initial Contact    This Community Health Worker completed OR updated the Social Determinant of Health questionnaire with MRN [id number] in person today.    Pt identified barriers of most importance are: Mental Health  Referrals to community agencies completed with patient/caregiver consent outside of [facility] include: No  Referrals were put through [facility] - No  Support and Services:[facility]   Other information discussed the patient needs / wants help with: Pt is suffering from anxiety due to excess stress and triggers daily. Pt has lost his job and sense of peace due to his anxiety as well as other traumatic situations that he's faced recently. Pt is currently uninsured and unemployed but would like resources for a psychiatrist to assist with his mental imbalance. I will look into all resources available to pt. Pt is also willing to pay out-of-pocket fees to see specialist.   Follow up required: Yes  Follow-up Outreach - Due: 4/5/2022

## 2022-03-30 NOTE — PROGRESS NOTES
Patient ID: Eduin Ugarte is a 26 y.o. male.    Chief Complaint: Anxiety, Depression, Abdominal Pain, Emesis, and Panic Attack    HPI       Eduin Ugarte is a 26 y.o. male here with complaints of depression anxiety.  No suicidal homicidal ideation.  Patient with lack of fine throughout the day.  Poor sleeping habits decreased mood and somber feeling.  Has panic attacks often both at work and at home.      Review of Symptoms      /72 (BP Location: Left arm, Patient Position: Sitting)   Pulse 98   Temp 98 °F (36.7 °C) (Oral)   Ht 6' (1.829 m)   Wt 121.5 kg (267 lb 13.7 oz)   SpO2 95%   BMI 36.33 kg/m²     Physical Exam    Vitals:    03/30/22 1512   BP: 122/72   BP Location: Left arm   Patient Position: Sitting   Pulse: 98   Temp: 98 °F (36.7 °C)   TempSrc: Oral   SpO2: 95%   Weight: 121.5 kg (267 lb 13.7 oz)   Height: 6' (1.829 m)       Constitutional:   Oriented to person, place, and time.appears well-developed and well-nourished.   No distress.      HENT  Head: Normocephalic and atraumatic  Right Ear: External ear normal.   Left Ear: External ear normal.   Nose: External nose normal.   Mouth: Moist mucous membranes    Eyes:   Conjunctivae are normal. Right eye exhibits no discharge. Left eye exhibits no discharge. No scleral icterus. No periorbital edema    Musculoskeletal:  No edema. No obvious deformity No wasting     Neurological:  Alert and oriented to person, place, and time. Coordination normal.     Skin:   Skin is warm and dry.  No diaphoresis.   No rash noted.     Psychiatric: Normal mood and affect. Behavior is normal. Judgment and thought content normal.     Complete Blood Count  Lab Results   Component Value Date    RBC 5.19 06/17/2019    HGB 15.9 06/17/2019    HCT 46.6 06/17/2019    MCV 89.8 06/17/2019    MCH 30.6 06/17/2019    MCHC 34.1 06/17/2019    RDW 12.8 06/17/2019     06/17/2019    MPV 9.7 06/17/2019    LYMPH 2,323 06/17/2019    LYMPH 20.2 06/17/2019    MONO 805  06/17/2019    MONO 7.0 06/17/2019     06/17/2019    BASO 115 06/17/2019    EOSINOPHIL 3.9 06/17/2019    BASOPHIL 1.0 06/17/2019       Comprehensive Metabolic Panel  No results found for: GLU, BUN, CREATININE, NA, K, CL, PROT, ALBUMIN, BILITOT, AST, ALKPHOS, CO2, ALT, ANIONGAP, EGFRNONAA, ESTGFRAFRICA    TSH  No results found for: TSH    Assessment / Plan:      ICD-10-CM ICD-9-CM   1. Depression, unspecified depression type  F32.A 311     Depression, unspecified depression type    Other orders  -     diazePAM (VALIUM) 5 MG tablet; Take 1 tablet (5 mg total) by mouth every 12 (twelve) hours as needed for Anxiety.  Dispense: 15 tablet; Refill: 0  -     busPIRone (BUSPAR) 10 MG tablet; Take 1 tablet (10 mg total) by mouth 2 (two) times daily.  Dispense: 60 tablet; Refill: 1  -     venlafaxine (EFFEXOR-XR) 150 MG Cp24; Take 2 capsules (300 mg total) by mouth once daily.  Dispense: 60 capsule; Refill: 11  -     montelukast (SINGULAIR) 10 mg tablet; Take 1 tablet (10 mg total) by mouth every evening.  Dispense: 30 tablet; Refill: 0  -     albuterol (PROVENTIL/VENTOLIN HFA) 90 mcg/actuation inhaler; Inhale 2 puffs into the lungs every 6 (six) hours as needed for Wheezing. Rescue  Dispense: 18 g; Refill: 1      Spent 40 minutes with patient in discussion

## 2022-04-01 NOTE — PROGRESS NOTES
CHW - Follow Up    This Community Health Worker completed a follow up visit with MIA [id number] over the phone today.  Pt/Caregiver reported: No Changes   Community Health Worker provided: [facility]; Virtual therapy sessions for pt to utilize as needed.   Follow up required: Yes   Follow-up Outreach - Due: 4/15/2022

## 2022-04-12 NOTE — PROGRESS NOTES
CHW - Follow Up    This Community Health Worker completed a follow up visit with MIA [id number] over the phone today.  Pt/Caregiver reported: No changes. Patient has not used [facility] because he is waiting on approval of medicaid. Patient states he does not have the money to pay out-of-pocket at this time and would rather wait to see if he gets approved for insurance and have it covered for him.  Community Health Worker provided: No resources provided at this time  Follow up required: Yes; 30 day follow-up  Follow-up Outreach - Due: 5/12/2022

## 2022-04-13 NOTE — PROGRESS NOTES
CHW - Follow Up    This Community Health Worker completed a follow up visit with MIA [id number] over the phone today.  Pt/Caregiver reported: Pt was approved for medicaid insurance   Community Health Worker provided: Pt was given the contact information [facility] and [facility] via e-mail and encouraged to call and visit their web site to gain more information until pt's insurance card comes in.    Follow up required: Yes  Follow-up Outreach - Due: 5/13/2022

## 2022-04-20 ENCOUNTER — TELEPHONE (OUTPATIENT)
Dept: FAMILY MEDICINE | Facility: CLINIC | Age: 27
End: 2022-04-20
Payer: MEDICAID

## 2022-04-20 RX ORDER — DIAZEPAM 5 MG/1
5 TABLET ORAL EVERY 12 HOURS PRN
Qty: 15 TABLET | Refills: 0 | Status: SHIPPED | OUTPATIENT
Start: 2022-04-20 | End: 2022-05-03 | Stop reason: SDUPTHER

## 2022-04-20 NOTE — TELEPHONE ENCOUNTER
----- Message from Ally Erickson sent at 4/12/2022  1:13 PM CDT -----  Regarding: Patient Request: Refill  Patient is requesting a refill on his Diazepam medication with an increase in dosage. Patient states he was still experiencing anxiety attacks while taking current dosage. Please let me know if I should direct patient to make an appointment first.

## 2022-04-20 NOTE — TELEPHONE ENCOUNTER
Your note was sent to a box I do not check all the time I apologize       I sent in a refill of your Valium--I do not want to go up on the dose I would rather you take it twice in a day rather than once if needed    Return to clinic so we can discuss a plan of action

## 2022-04-21 RX ORDER — MONTELUKAST SODIUM 10 MG/1
TABLET ORAL
Qty: 90 TABLET | Refills: 3 | Status: SHIPPED | OUTPATIENT
Start: 2022-04-21 | End: 2022-05-03

## 2022-04-21 RX ORDER — BUSPIRONE HYDROCHLORIDE 10 MG/1
TABLET ORAL
Qty: 60 TABLET | Refills: 1 | Status: SHIPPED | OUTPATIENT
Start: 2022-04-21 | End: 2022-05-03

## 2022-04-21 NOTE — TELEPHONE ENCOUNTER
Care Due:                  Date            Visit Type   Department     Provider  --------------------------------------------------------------------------------                                EP -                              PRIMARY      [facility] FAMILY  Last Visit: 03-      CARE ([facility])   MEDICINE       Oli Mendiola                              EP -                              PRIMARY      [facility] FAM[facility]  Next Visit: 05-      CARE ([facility])   [facility]       Oli Mendiola                                                            Last  Test          Frequency    Reason                     Performed    Due Date  --------------------------------------------------------------------------------    Cr..........  12 months..  venlafaxine..............  Not Found    Overdue    Powered by [employer]. Reference number: [id number].   4/21/2022 12:32:29 PM CDT

## 2022-04-21 NOTE — TELEPHONE ENCOUNTER
Refill Routing Note   Medication(s) are not appropriate for processing by [facility] for the following reason(s):      - Medication is a new start (<3 months)    [facility] action(s):  Defer          --->Care Gap information included in message below if applicable.   Medication reconciliation completed: No   Automatic Epic Generated Protocol Data:        Requested Prescriptions   Pending Prescriptions Disp Refills    montelukast (SINGULAIR) 10 mg tablet [Pharmacy Med Name: MONTELUKAST SOD 10 MG TABLET] 90 tablet 3     Sig: TAKE 1 TABLET BY MOUTH EVERY DAY IN THE EVENING       Pulmonology:  Leukotriene Inhibitors Passed - 4/21/2022 12:32 PM        Passed - Patient is at least 18 years old        Passed - Valid encounter within last 15 months     Recent Visits  Date Type Provider Dept   03/30/22 Office Visit Oli Mendiola MD [facility] Family Medicine   05/19/20 Office Visit Oli Mendiola MD [facility] Family Medicine   Showing recent visits within past 720 days and meeting all other requirements  Future Appointments  No visits were found meeting these conditions.  Showing future appointments within next 150 days and meeting all other requirements      Future Appointments              In 1 week Oli Mendiola MD [facility]                Passed - Matches previous order       Previous Authorizing Provider: Oli Mendiola MD (montelukast (SINGULAIR) 10 mg tablet)  Previous Pharmacy: [facility]/pharmacy #[id number] - [place], [address] AT [facility]            Passed - No ED/Hospital visits since last PCP visit     Last PCP Visit: 3/30/2022 Last Admission: 12/23/2014 Last ED Visit: 3/29/2022                Appointments  past 12m or future 3m with PCP    Date Provider   Last Visit   3/30/2022 Oli Mendiola MD   Next Visit   5/3/2022 Oli Mendiola MD   ED visits in past 90 days: 1        Note composed:2:19 PM 04/21/2022

## 2022-05-03 ENCOUNTER — OFFICE VISIT (OUTPATIENT)
Dept: FAMILY MEDICINE | Facility: CLINIC | Age: 27
End: 2022-05-03
Payer: MEDICAID

## 2022-05-03 VITALS
SYSTOLIC BLOOD PRESSURE: 108 MMHG | HEART RATE: 92 BPM | BODY MASS INDEX: 35.72 KG/M2 | OXYGEN SATURATION: 98 % | DIASTOLIC BLOOD PRESSURE: 74 MMHG | WEIGHT: 263.75 LBS | HEIGHT: 72 IN | TEMPERATURE: 98 F

## 2022-05-03 DIAGNOSIS — F32.A DEPRESSION, UNSPECIFIED DEPRESSION TYPE: Primary | ICD-10-CM

## 2022-05-03 PROCEDURE — 3078F PR MOST RECENT DIASTOLIC BLOOD PRESSURE < 80 MM HG: ICD-10-PCS | Mod: CPTII,S$GLB,, | Performed by: FAMILY MEDICINE

## 2022-05-03 PROCEDURE — 99214 OFFICE O/P EST MOD 30 MIN: CPT | Mod: S$GLB,,, | Performed by: FAMILY MEDICINE

## 2022-05-03 PROCEDURE — 1159F MED LIST DOCD IN RCRD: CPT | Mod: CPTII,S$GLB,, | Performed by: FAMILY MEDICINE

## 2022-05-03 PROCEDURE — 1159F PR MEDICATION LIST DOCUMENTED IN MEDICAL RECORD: ICD-10-PCS | Mod: CPTII,S$GLB,, | Performed by: FAMILY MEDICINE

## 2022-05-03 PROCEDURE — 99214 PR OFFICE/OUTPT VISIT, EST, LEVL IV, 30-39 MIN: ICD-10-PCS | Mod: S$GLB,,, | Performed by: FAMILY MEDICINE

## 2022-05-03 PROCEDURE — 3078F DIAST BP <80 MM HG: CPT | Mod: CPTII,S$GLB,, | Performed by: FAMILY MEDICINE

## 2022-05-03 PROCEDURE — 3008F BODY MASS INDEX DOCD: CPT | Mod: CPTII,S$GLB,, | Performed by: FAMILY MEDICINE

## 2022-05-03 PROCEDURE — 3074F PR MOST RECENT SYSTOLIC BLOOD PRESSURE < 130 MM HG: ICD-10-PCS | Mod: CPTII,S$GLB,, | Performed by: FAMILY MEDICINE

## 2022-05-03 PROCEDURE — 3008F PR BODY MASS INDEX (BMI) DOCUMENTED: ICD-10-PCS | Mod: CPTII,S$GLB,, | Performed by: FAMILY MEDICINE

## 2022-05-03 PROCEDURE — 3074F SYST BP LT 130 MM HG: CPT | Mod: CPTII,S$GLB,, | Performed by: FAMILY MEDICINE

## 2022-05-03 RX ORDER — BUSPIRONE HYDROCHLORIDE 15 MG/1
15 TABLET ORAL 3 TIMES DAILY
Qty: 90 TABLET | Refills: 1 | Status: SHIPPED | OUTPATIENT
Start: 2022-05-03 | End: 2022-08-27 | Stop reason: CLARIF

## 2022-05-03 RX ORDER — DIAZEPAM 5 MG/1
5 TABLET ORAL EVERY 12 HOURS PRN
Qty: 10 TABLET | Refills: 0 | Status: SHIPPED | OUTPATIENT
Start: 2022-05-03 | End: 2024-01-23

## 2022-05-03 NOTE — PROGRESS NOTES
Patient ID: Eduin Ugarte is a 26 y.o. male.    Chief Complaint: Follow-up and Medication Problem (Discuss Venlafaxine)    HPI      Eduin Ugarte is a 26 y.o. male patient here following up on depression and anxiety.  Patient has been taking Effexor 150 milligrams two tablets daily.  This is helped above one tablet.  Anxiety-still gets some anxiety symptoms going into [employer] some such situations but otherwise better.  Not able to  Valium because of prior cessation.  20 dollars for one prescription.  Does not/did not want to use very much but occasionally with high anxiety was hoping to take a tablet.  Using BuSpar 10 milligrams b.i.d. without a problem-actually helping as well.    Vitals:    05/03/22 1422   BP: 108/74   BP Location: Right arm   Patient Position: Sitting   Pulse: 92   Temp: 98 °F (36.7 °C)   TempSrc: Oral   SpO2: 98%   Weight: 119.7 kg (263 lb 12.5 oz)   Height: 6' (1.829 m)            Review of Symptoms      Physical Exam    Constitutional:  Oriented to person, place, and time.appears well-developed and well-nourished.  No distress.      HENT  Head: Normocephalic and atraumatic  Right Ear: External ear normal.   Left Ear: External ear normal.   Nose: External nose normal.   Mouth:  Moist mucus membranes.    Eyes:  Conjunctivae are normal. Right eye exhibits no discharge.  Left eye exhibits no discharge. No scleral icterus.  No periorbital edema    Cardiovascular:  Regular rate and rhythm with normal S1 and S2     Pulmonary/Chest:   Clear to auscultation bilaterally without wheezes, rhonchi or rales      Musculoskeletal:  No edema. No obvious deformity No wasting       Neurological:  Alert and oriented to person, place, and time.   Coordination normal.     Skin:   Skin is warm and dry.  No diaphoresis.   No rash noted.     Psychiatric: Normal mood and affect. Behavior is normal.  Judgment and thought content normal.     Complete Blood Count  Lab Results   Component Value Date    RBC  5.19 06/17/2019    HGB 15.9 06/17/2019    HCT 46.6 06/17/2019    MCV 89.8 06/17/2019    MCH 30.6 06/17/2019    MCHC 34.1 06/17/2019    RDW 12.8 06/17/2019     06/17/2019    MPV 9.7 06/17/2019    LYMPH 2,323 06/17/2019    LYMPH 20.2 06/17/2019    MONO 805 06/17/2019    MONO 7.0 06/17/2019     06/17/2019    BASO 115 06/17/2019    EOSINOPHIL 3.9 06/17/2019    BASOPHIL 1.0 06/17/2019       Comprehensive Metabolic Panel  No results found for: GLU, BUN, CREATININE, NA, K, CL, PROT, ALBUMIN, BILITOT, AST, ALKPHOS, CO2, ALT, ANIONGAP, EGFRNONAA, ESTGFRAFRICA    TSH  No results found for: TSH    Assessment / Plan:      ICD-10-CM ICD-9-CM   1. Depression, unspecified depression type  F32.A 311     Depression, unspecified depression type    Other orders  -     busPIRone (BUSPAR) 15 MG tablet; Take 1 tablet (15 mg total) by mouth 3 (three) times daily.  Dispense: 90 tablet; Refill: 1  -     diazePAM (VALIUM) 5 MG tablet; Take 1 tablet (5 mg total) by mouth every 12 (twelve) hours as needed for Anxiety.  Dispense: 10 tablet; Refill: 0    Continue current medication increase BuSpar from 10 milligrams to 50 milligrams-Valium p.r.n. I would stay the course this time-appears things are going well given how he is feeling lately.

## 2022-05-19 RX ORDER — BUSPIRONE HYDROCHLORIDE 10 MG/1
TABLET ORAL
Qty: 60 TABLET | Refills: 1 | Status: SHIPPED | OUTPATIENT
Start: 2022-05-19 | End: 2022-08-27 | Stop reason: CLARIF

## 2022-08-19 ENCOUNTER — PATIENT OUTREACH (OUTPATIENT)
Dept: ADMINISTRATIVE | Facility: OTHER | Age: 27
End: 2022-08-19
Payer: MEDICAID

## 2022-08-19 NOTE — PROGRESS NOTES
CHW - Case Closure    This Community Health Worker spoke to MIA [id number] over the phone today.   Pt/Caregiver reported: Unable to reach patient since April. Patient initially requested assistance with mental health resources and was provided multiple options to choose from via email and confirmed receiving the resources. Patient is also now insured.   Pt/Caregiver denied any additional needs at this time and agrees with episode closure at this time.  Provided MIA White with Community Health Worker's contact information and encouraged him/her to contact this Community Health Worker if additional needs arise.

## 2022-08-27 ENCOUNTER — HOSPITAL ENCOUNTER (EMERGENCY)
Facility: HOSPITAL | Age: 27
Discharge: HOME | End: 2022-08-27
Attending: EMERGENCY MEDICINE
Payer: MEDICAID

## 2022-08-27 VITALS
RESPIRATION RATE: 16 BRPM | OXYGEN SATURATION: 98 % | SYSTOLIC BLOOD PRESSURE: 145 MMHG | WEIGHT: 280 LBS | TEMPERATURE: 99 F | DIASTOLIC BLOOD PRESSURE: 95 MMHG | HEART RATE: 73 BPM | BODY MASS INDEX: 37.97 KG/M2

## 2022-08-27 DIAGNOSIS — M54.41 ACUTE BILATERAL LOW BACK PAIN WITH RIGHT-SIDED SCIATICA: Primary | ICD-10-CM

## 2022-08-27 PROCEDURE — 99284 EMERGENCY DEPT VISIT MOD MDM: CPT | Mod: ER

## 2022-08-27 PROCEDURE — 63600175 PHARM REV CODE 636 W HCPCS: Mod: ER | Performed by: EMERGENCY MEDICINE

## 2022-08-27 PROCEDURE — 96372 THER/PROPH/DIAG INJ SC/IM: CPT | Performed by: EMERGENCY MEDICINE

## 2022-08-27 PROCEDURE — 25000003 PHARM REV CODE 250: Mod: ER | Performed by: EMERGENCY MEDICINE

## 2022-08-27 RX ORDER — CYCLOBENZAPRINE HCL 10 MG
10 TABLET ORAL EVERY 8 HOURS PRN
Qty: 14 TABLET | Refills: 0 | Status: SHIPPED | OUTPATIENT
Start: 2022-08-27 | End: 2022-09-01

## 2022-08-27 RX ORDER — DEXAMETHASONE SODIUM PHOSPHATE 4 MG/ML
8 INJECTION, SOLUTION INTRA-ARTICULAR; INTRALESIONAL; INTRAMUSCULAR; INTRAVENOUS; SOFT TISSUE
Status: COMPLETED | OUTPATIENT
Start: 2022-08-27 | End: 2022-08-27

## 2022-08-27 RX ORDER — PROCHLORPERAZINE EDISYLATE 5 MG/ML
10 INJECTION INTRAMUSCULAR; INTRAVENOUS
Status: COMPLETED | OUTPATIENT
Start: 2022-08-27 | End: 2022-08-27

## 2022-08-27 RX ORDER — LIDOCAINE 50 MG/G
1 PATCH TOPICAL
Status: DISCONTINUED | OUTPATIENT
Start: 2022-08-27 | End: 2022-08-27 | Stop reason: HOSPADM

## 2022-08-27 RX ORDER — LIDOCAINE 50 MG/G
1 PATCH TOPICAL DAILY
Qty: 15 PATCH | Refills: 0 | Status: SHIPPED | OUTPATIENT
Start: 2022-08-27

## 2022-08-27 RX ADMIN — LIDOCAINE 1 PATCH: 50 PATCH CUTANEOUS at 11:08

## 2022-08-27 RX ADMIN — DEXAMETHASONE SODIUM PHOSPHATE 8 MG: 4 INJECTION, SOLUTION INTRA-ARTICULAR; INTRALESIONAL; INTRAMUSCULAR; INTRAVENOUS; SOFT TISSUE at 11:08

## 2022-08-27 RX ADMIN — PROCHLORPERAZINE EDISYLATE 10 MG: 5 INJECTION, SOLUTION INTRAMUSCULAR; INTRAVENOUS at 11:08

## 2022-08-27 NOTE — DISCHARGE INSTRUCTIONS
I recommend taking ibuprofen 600 mg every 8 hours with food and/or Tylenol 650 mg every 8 hours as needed for pain in addition to the prescribed medication.  Please call your primary care physician for a follow-up appointment for further evaluation and management.  Please return with any new or worsening symptoms.

## 2022-08-27 NOTE — ED PROVIDER NOTES
Encounter Date: 8/27/2022       History     Chief Complaint   Patient presents with    Low-back Pain     I am having right lower back pain that goes down my right leg 4 days ago. + diaphoretic      27-year-old male presents emergency department complaining of for 5 days of back pain.  States he stepped awkwardly off of a curb and kind of twisted his back.  Since that time he has been having diffuse low back pain that radiates down the back of the right leg.  Pain has been constant, minimal improvement with over-the-counter medications.  Worse with ambulation and certain movements and positions.  Notes some tingling to his distal right foot but denies any numbness or weakness, loss of continence, difficulty urinating defecating, or saddle paresthesias.  No other symptoms reported at this time.     Review of patient's allergies indicates:   Allergen Reactions    Ether for anesthesia Other (See Comments)     Popped blood vessel in eye       Past Medical History:   Diagnosis Date    Anxiety disorder, unspecified     Asthma     Depression      History reviewed. No pertinent surgical history.  Family History   Problem Relation Age of Onset    Hypertension Father     Arthritis Father     Cancer Maternal Grandmother     Cancer Paternal Grandmother     Diabetes Maternal Grandfather     Heart disease Maternal Grandfather      Social History     Tobacco Use    Smoking status: Every Day     Packs/day: 0.50     Years: 1.00     Pack years: 0.50     Types: Cigarettes    Smokeless tobacco: Never   Substance Use Topics    Alcohol use: No    Drug use: No     Review of Systems   Constitutional:  Negative for chills, fatigue and fever.   HENT:  Negative for congestion and sore throat.    Eyes:  Negative for photophobia and visual disturbance.   Respiratory:  Negative for cough and shortness of breath.    Cardiovascular:  Negative for chest pain and palpitations.   Gastrointestinal:  Negative for abdominal pain, diarrhea and vomiting.    Musculoskeletal:  Positive for back pain. Negative for neck pain and neck stiffness.   Neurological:  Negative for light-headedness, numbness and headaches.     Physical Exam     Initial Vitals [08/27/22 1042]   BP Pulse Resp Temp SpO2   (!) 145/95 73 16 98.5 °F (36.9 °C) 98 %      MAP       --         Physical Exam    Nursing note and vitals reviewed.  Constitutional: He appears well-developed and well-nourished. No distress.   HENT:   Head: Normocephalic and atraumatic.   Eyes: Conjunctivae and EOM are normal. Pupils are equal, round, and reactive to light.   Neck: Neck supple. No tracheal deviation present.   Normal range of motion.  Cardiovascular:  Normal rate and intact distal pulses.           Pulmonary/Chest: No respiratory distress.   Musculoskeletal:         General: Tenderness (Diffuse low back TTP; No point or bony TTP) present. No edema. Normal range of motion.      Cervical back: Normal range of motion and neck supple.     Neurological: He is alert and oriented to person, place, and time. He has normal strength. No cranial nerve deficit. GCS score is 15. GCS eye subscore is 4. GCS verbal subscore is 5. GCS motor subscore is 6.   Skin: Skin is warm and dry.       ED Course   Procedures  Labs Reviewed - No data to display       Imaging Results    None          Medications   LIDOcaine 5 % patch 1 patch (1 patch Transdermal Patch Applied 8/27/22 1125)   dexamethasone injection 8 mg (8 mg Intramuscular Given 8/27/22 1126)   prochlorperazine injection Soln 10 mg (10 mg Intramuscular Given 8/27/22 1126)     Medical Decision Making:   Initial Assessment:   19-year-old male presents emergency department complaining of low back pain  Differential Diagnosis:   Sprain, strain, radiculopathy, cauda equina  ED Management:  Patient given IM Decadron and Compazine as well as a Lidoderm patch.  Patient reports moderate improvement in symptoms but has to go to work and is requesting discharge.  Patient informed of  plan to discharge with prescriptions for Lidoderm and Flexeril, instructed on home management, follow up with primary care physician, strict return precautions.  Vital signs stable.                     Clinical Impression:   Final diagnoses:  [M54.41] Acute bilateral low back pain with right-sided sciatica (Primary)      ED Disposition Condition    Discharge Stable          ED Prescriptions       Medication Sig Dispense Start Date End Date Auth. Provider    LIDOcaine (LIDODERM) 5 % Place 1 patch onto the skin once daily. Remove & Discard patch within 12 hours or as directed by MD 15 patch 8/27/2022 -- Nick Owens MD    cyclobenzaprine (FLEXERIL) 10 MG tablet Take 1 tablet (10 mg total) by mouth every 8 (eight) hours as needed for Muscle spasms. 14 tablet 8/27/2022 9/1/2022 Nick Owens MD          Follow-up Information       Follow up With Specialties Details Why Contact Info    Oli Mendiola MD Family Medicine Schedule an appointment as soon as possible for a visit   [address] [place] [id number]  [phone]               Nick Owens MD  08/27/22 8699

## 2022-08-27 NOTE — Clinical Note
"Eduin Cooney" Torito was seen and treated in our emergency department on 8/27/2022.  He may return to work on 08/30/2022.       If you have any questions or concerns, please don't hesitate to call.      Rosa Magallon RN    "

## 2024-01-12 ENCOUNTER — HOSPITAL ENCOUNTER (EMERGENCY)
Facility: HOSPITAL | Age: 29
Discharge: HOME | End: 2024-01-12
Attending: EMERGENCY MEDICINE
Payer: MEDICAID

## 2024-01-12 VITALS
TEMPERATURE: 99 F | DIASTOLIC BLOOD PRESSURE: 68 MMHG | OXYGEN SATURATION: 95 % | SYSTOLIC BLOOD PRESSURE: 132 MMHG | RESPIRATION RATE: 18 BRPM | HEART RATE: 90 BPM

## 2024-01-12 DIAGNOSIS — R06.02 SHORTNESS OF BREATH: ICD-10-CM

## 2024-01-12 DIAGNOSIS — F17.210 CIGARETTE SMOKER: ICD-10-CM

## 2024-01-12 DIAGNOSIS — J20.9 ACUTE BRONCHITIS, UNSPECIFIED ORGANISM: Primary | ICD-10-CM

## 2024-01-12 LAB — SARS-COV-2 RDRP RESP QL NAA+PROBE: NEGATIVE

## 2024-01-12 PROCEDURE — 94640 AIRWAY INHALATION TREATMENT: CPT

## 2024-01-12 PROCEDURE — 63600175 PHARM REV CODE 636 W HCPCS: Performed by: EMERGENCY MEDICINE

## 2024-01-12 PROCEDURE — 99284 EMERGENCY DEPT VISIT MOD MDM: CPT | Mod: 25

## 2024-01-12 PROCEDURE — U0002 COVID-19 LAB TEST NON-CDC: HCPCS | Performed by: EMERGENCY MEDICINE

## 2024-01-12 PROCEDURE — 25000242 PHARM REV CODE 250 ALT 637 W/ HCPCS

## 2024-01-12 RX ORDER — PREDNISONE 20 MG/1
60 TABLET ORAL
Status: COMPLETED | OUTPATIENT
Start: 2024-01-12 | End: 2024-01-12

## 2024-01-12 RX ORDER — ALBUTEROL SULFATE 90 UG/1
1-2 AEROSOL, METERED RESPIRATORY (INHALATION) EVERY 6 HOURS PRN
Qty: 18 G | Refills: 3 | Status: SHIPPED | OUTPATIENT
Start: 2024-01-12

## 2024-01-12 RX ORDER — BENZONATATE 100 MG/1
200 CAPSULE ORAL 3 TIMES DAILY PRN
Qty: 20 CAPSULE | Refills: 0 | Status: SHIPPED | OUTPATIENT
Start: 2024-01-12

## 2024-01-12 RX ORDER — NICOTINE 7MG/24HR
1 PATCH, TRANSDERMAL 24 HOURS TRANSDERMAL DAILY
Qty: 30 PATCH | Refills: 0 | Status: SHIPPED | OUTPATIENT
Start: 2024-01-12 | End: 2024-01-23

## 2024-01-12 RX ORDER — PREDNISONE 20 MG/1
60 TABLET ORAL DAILY
Qty: 12 TABLET | Refills: 0 | Status: SHIPPED | OUTPATIENT
Start: 2024-01-13 | End: 2024-01-17

## 2024-01-12 RX ORDER — IPRATROPIUM BROMIDE AND ALBUTEROL SULFATE 2.5; .5 MG/3ML; MG/3ML
3 SOLUTION RESPIRATORY (INHALATION)
Status: COMPLETED | OUTPATIENT
Start: 2024-01-12 | End: 2024-01-12

## 2024-01-12 RX ORDER — AZITHROMYCIN 250 MG/1
TABLET, FILM COATED ORAL
Qty: 6 TABLET | Refills: 0 | Status: SHIPPED | OUTPATIENT
Start: 2024-01-12 | End: 2024-01-23

## 2024-01-12 RX ORDER — IBUPROFEN 200 MG
1 TABLET ORAL DAILY
Qty: 7 PATCH | Refills: 0 | Status: SHIPPED | OUTPATIENT
Start: 2024-01-12 | End: 2024-01-23

## 2024-01-12 RX ADMIN — IPRATROPIUM BROMIDE AND ALBUTEROL SULFATE 3 ML: .5; 3 SOLUTION RESPIRATORY (INHALATION) at 12:01

## 2024-01-12 RX ADMIN — PREDNISONE 60 MG: 20 TABLET ORAL at 01:01

## 2024-01-12 NOTE — Clinical Note
"Eduin Cooney" Torito was seen and treated in our emergency department on 1/12/2024.  He may return to work on 01/15/2024.       If you have any questions or concerns, please don't hesitate to call.      Francisco Lux MD"

## 2024-01-12 NOTE — ED TRIAGE NOTES
Pt with hx of asthma arrived with c/o SOB and chest pressure x 1 day.  Reports he was dx'd with flu last week.  Pt states he doesn't have an inhaler at home.  Pt endorses dizziness, denies any fever.  Took nyquil, day quil, delsym, and alkaseltzer with no relief.  Pt answering questions appropriately, speaking in complete sentences, respirations even and unlabored.  Aao x 4.

## 2024-01-12 NOTE — DISCHARGE INSTRUCTIONS
Thank you for coming in to see us at [facility]! It was nice to meet you, and I hope you feel better soon. Please feel free to return to the ER at any time should your symptoms get worse, or if you have different emergent concerns.    Our goal in the emergency department is to always give you outstanding care and exceptional service. You may receive a survey by mail or e-mail in the next week regarding your experience in our ED. We would greatly appreciate your completing and returning the survey. Your feedback provides us with a way to recognize our staff who give very good care and it helps us learn how to improve when your experience was below our aspiration of excellence.       Sincerely,    Francisco Lux MD  Medical Director  Emergency Department  [facility]
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
## 2024-01-18 ENCOUNTER — TELEPHONE (OUTPATIENT)
Dept: FAMILY MEDICINE | Facility: CLINIC | Age: 29
End: 2024-01-18
Payer: MEDICAID

## 2024-01-18 NOTE — TELEPHONE ENCOUNTER
----- Message from Kerry Bowers sent at 1/18/2024  9:36 AM CST -----  Regarding: appt  Contact: mom  The pt needs an appt to follow up form an ER visit.  [facility] is where he went.  Please call his mom Jonelle Ugarte at [phone]

## 2024-01-18 NOTE — TELEPHONE ENCOUNTER
Pt is scheduled to see Kendraie next week for ER f/u   Acute bronchitis     They saw a spot on his lung on CXR    Pt mom wants to make sure you are aware of this

## 2024-01-23 ENCOUNTER — OFFICE VISIT (OUTPATIENT)
Dept: FAMILY MEDICINE | Facility: CLINIC | Age: 29
End: 2024-01-23
Payer: MEDICAID

## 2024-01-23 VITALS
HEIGHT: 72 IN | DIASTOLIC BLOOD PRESSURE: 70 MMHG | SYSTOLIC BLOOD PRESSURE: 118 MMHG | BODY MASS INDEX: 34.88 KG/M2 | HEART RATE: 99 BPM | TEMPERATURE: 98 F | WEIGHT: 257.5 LBS | OXYGEN SATURATION: 97 %

## 2024-01-23 DIAGNOSIS — J20.9 ACUTE BRONCHITIS, UNSPECIFIED ORGANISM: ICD-10-CM

## 2024-01-23 DIAGNOSIS — F41.9 ANXIETY: ICD-10-CM

## 2024-01-23 DIAGNOSIS — Z72.0 TOBACCO USE: ICD-10-CM

## 2024-01-23 DIAGNOSIS — J45.909 ASTHMA, UNSPECIFIED ASTHMA SEVERITY, UNSPECIFIED WHETHER COMPLICATED, UNSPECIFIED WHETHER PERSISTENT: Primary | ICD-10-CM

## 2024-01-23 PROCEDURE — 3008F BODY MASS INDEX DOCD: CPT | Mod: CPTII,S$GLB,, | Performed by: PHYSICIAN ASSISTANT

## 2024-01-23 PROCEDURE — 1160F RVW MEDS BY RX/DR IN RCRD: CPT | Mod: CPTII,S$GLB,, | Performed by: PHYSICIAN ASSISTANT

## 2024-01-23 PROCEDURE — 99214 OFFICE O/P EST MOD 30 MIN: CPT | Mod: 25,S$GLB,, | Performed by: PHYSICIAN ASSISTANT

## 2024-01-23 PROCEDURE — 3074F SYST BP LT 130 MM HG: CPT | Mod: CPTII,S$GLB,, | Performed by: PHYSICIAN ASSISTANT

## 2024-01-23 PROCEDURE — 1159F MED LIST DOCD IN RCRD: CPT | Mod: CPTII,S$GLB,, | Performed by: PHYSICIAN ASSISTANT

## 2024-01-23 PROCEDURE — 99407 BEHAV CHNG SMOKING > 10 MIN: CPT | Mod: S$GLB,,, | Performed by: PHYSICIAN ASSISTANT

## 2024-01-23 PROCEDURE — 3078F DIAST BP <80 MM HG: CPT | Mod: CPTII,S$GLB,, | Performed by: PHYSICIAN ASSISTANT

## 2024-01-23 RX ORDER — MONTELUKAST SODIUM 10 MG/1
10 TABLET ORAL NIGHTLY
Qty: 30 TABLET | Refills: 0 | Status: SHIPPED | OUTPATIENT
Start: 2024-01-23 | End: 2024-02-22

## 2024-01-23 RX ORDER — ALBUTEROL SULFATE 0.83 MG/ML
2.5 SOLUTION RESPIRATORY (INHALATION) EVERY 6 HOURS PRN
Qty: 25 EACH | Refills: 5 | Status: SHIPPED | OUTPATIENT
Start: 2024-01-23 | End: 2025-01-22

## 2024-01-23 RX ORDER — IBUPROFEN 200 MG
1 TABLET ORAL DAILY
Qty: 7 PATCH | Refills: 0 | Status: SHIPPED | OUTPATIENT
Start: 2024-01-23

## 2024-01-23 RX ORDER — SERTRALINE HYDROCHLORIDE 50 MG/1
50 TABLET, FILM COATED ORAL DAILY
Qty: 30 TABLET | Refills: 11 | Status: SHIPPED | OUTPATIENT
Start: 2024-01-23 | End: 2024-02-06 | Stop reason: SDUPTHER

## 2024-01-23 NOTE — PROGRESS NOTES
Patient ID: Eduin Ugarte is a 28 y.o. male.     Chief Complaint: Follow-up (ER )    28 year old male with history of anxiety and asthma presents to clinic for ED follow up of acute bronchitis/asthma exacerbation. Patient went to ED on 1/12/2024 for with symptoms of cough, wheezing, and SOB on mild exertion. States he was given nebulizer and sent home with steroids. States next day he had symptoms where he could not catch breath. States he started family member's nebulizer, with relief. Patient admits he lost nebulizer in [place], and never got a new one. Previous to this, patient with mild intermittent asthma, using albuterol inhaler <2x per month. Patient admits to decrease in cigarette smoking from 1-1.5 PPD to 4 cigarettes per day. Interested in quitting at this time. Today denies fever, uncontrolled cough, SOB at rest, CP, N/V/D.          Review of Systems  Review of Systems   Constitutional:  Negative for fever.   HENT:  Negative for ear pain and sinus pain.    Eyes:  Negative for discharge.   Respiratory:  Positive for cough. Negative for wheezing.    Cardiovascular:  Negative for chest pain and leg swelling.   Gastrointestinal:  Negative for diarrhea, nausea and vomiting.   Genitourinary:  Negative for urgency.   Musculoskeletal:  Negative for myalgias.   Skin:  Negative for rash.   Neurological:  Negative for weakness and headaches.   Psychiatric/Behavioral:  Negative for depression.        Currently Medications  Current Outpatient Medications on File Prior to Visit   Medication Sig Dispense Refill    albuterol (PROVENTIL/VENTOLIN HFA) 90 mcg/actuation inhaler Inhale 1-2 puffs into the lungs every 6 (six) hours as needed for Wheezing. Rescue 18 g 3    benzonatate (TESSALON) 100 MG capsule Take 2 capsules (200 mg total) by mouth 3 (three) times daily as needed for Cough. 20 capsule 0    LIDOcaine (LIDODERM) 5 % Place 1 patch onto the skin once daily. Remove & Discard patch within 12 hours or as  directed by MD 15 patch 0    [DISCONTINUED] nicotine (NICODERM CQ) 14 mg/24 hr Place 1 patch onto the skin once daily. 7 patch 0    [DISCONTINUED] nicotine (NICODERM CQ) 21 mg/24 hr Place 1 patch onto the skin once daily. 7 patch 0    [DISCONTINUED] nicotine (NICODERM CQ) 7 mg/24 hr Place 1 patch onto the skin once daily. 30 patch 0    [DISCONTINUED] azithromycin (Z-MARY) 250 MG tablet Take first 2 tablets together, then 1 every day until finished. (Patient not taking: Reported on 1/23/2024) 6 tablet 0    [DISCONTINUED] diazePAM (VALIUM) 5 MG tablet Take 1 tablet (5 mg total) by mouth every 12 (twelve) hours as needed for Anxiety. (Patient not taking: Reported on 1/23/2024) 10 tablet 0     No current facility-administered medications on file prior to visit.       Physical  Exam  Vitals:    01/23/24 0911   BP: 118/70   BP Location: Left arm   Patient Position: Sitting   Pulse: 99   Temp: 98.1 °F (36.7 °C)   TempSrc: Oral   SpO2: 97%   Weight: 116.8 kg (257 lb 8 oz)   Height: 6' (1.829 m)      Body mass index is 34.92 kg/m².  Wt Readings from Last 3 Encounters:   01/23/24 116.8 kg (257 lb 8 oz)   08/27/22 127 kg (280 lb)   05/03/22 119.7 kg (263 lb 12.5 oz)       Physical Exam  Vitals and nursing note reviewed.   Constitutional:       General: He is not in acute distress.     Appearance: He is not ill-appearing.      Comments: Malodorous of cigarette smoke   HENT:      Head: Normocephalic and atraumatic.      Right Ear: External ear normal.      Left Ear: External ear normal.      Nose: Nose normal.      Mouth/Throat:      Mouth: Mucous membranes are moist.   Eyes:      Extraocular Movements: Extraocular movements intact.      Conjunctiva/sclera: Conjunctivae normal.   Cardiovascular:      Rate and Rhythm: Normal rate and regular rhythm.      Pulses: Normal pulses.      Heart sounds: No murmur heard.  Pulmonary:      Effort: Pulmonary effort is normal. No respiratory distress.      Breath sounds: No wheezing.  "  Abdominal:      General: There is no distension.      Palpations: Abdomen is soft. There is no mass.      Tenderness: There is no abdominal tenderness.   Musculoskeletal:         General: No swelling.      Cervical back: Normal range of motion.   Skin:     Coloration: Skin is not jaundiced.      Findings: No rash.   Neurological:      General: No focal deficit present.      Mental Status: He is alert and oriented to person, place, and time.   Psychiatric:         Mood and Affect: Mood normal.         Behavior: Behavior is cooperative.         Thought Content: Thought content normal.         Labs:    Complete Blood Count  Lab Results   Component Value Date    RBC 5.19 06/17/2019    HGB 15.9 06/17/2019    HCT 46.6 06/17/2019    MCV 89.8 06/17/2019    MCH 30.6 06/17/2019    MCHC 34.1 06/17/2019    RDW 12.8 06/17/2019     06/17/2019    MPV 9.7 06/17/2019    LYMPH 2,323 06/17/2019    LYMPH 20.2 06/17/2019    MONO 805 06/17/2019    MONO 7.0 06/17/2019     06/17/2019    BASO 115 06/17/2019    EOSINOPHIL 3.9 06/17/2019    BASOPHIL 1.0 06/17/2019       Comprehensive Metabolic Panel  No results found for: "GLU", "BUN", "CREATININE", "NA", "K", "CL", "PROT", "ALBUMIN", "BILITOT", "AST", "ALKPHOS", "CO2", "ALT", "ANIONGAP", "EGFRNONAA", "ESTGFRAFRICA"    TSH  No results found for: "TSH"    Imaging:  X-Ray Chest AP Portable  Narrative: EXAMINATION:  XR CHEST AP PORTABLE    CLINICAL HISTORY:  Shortness of breath    TECHNIQUE:  Single frontal view of the chest was performed.    COMPARISON:  None    FINDINGS:  The cardiac silhouette is normal in size.  The pulmonary vascularity is normal.    The lungs are normally inflated.    Small area of increased attenuation left lung base most suggestive of subsegmental atelectasis or minimal airspace disease.    No pleural effusion.  No pneumothorax.    The osseous structures appear normal.  Impression: Small area of increased attenuation left lung base possibly favoring " minimal atelectasis or airspace disease.    Electronically signed by: Nanette Grayson MD  Date:    01/12/2024  Time:    12:15      Assessment/Plan:    1. Asthma, unspecified asthma severity, unspecified whether complicated, unspecified whether persistent  Comments:  - Prescription for nebulizer and albuterol solution  - Singulair daily  - Quit smoking  Orders:  -     albuterol (PROVENTIL) 2.5 mg /3 mL (0.083 %) nebulizer solution; Take 3 mLs (2.5 mg total) by nebulization every 6 (six) hours as needed for Wheezing. Rescue  Dispense: 25 each; Refill: 5  -     nicotine (NICODERM CQ) 14 mg/24 hr; Place 1 patch onto the skin once daily.  Dispense: 7 patch; Refill: 0  -     montelukast (SINGULAIR) 10 mg tablet; Take 1 tablet (10 mg total) by mouth every evening.  Dispense: 30 tablet; Refill: 0    2. Acute bronchitis, unspecified organism  Comments:  - Improved, continue asthma treatment  Orders:  -     albuterol (PROVENTIL) 2.5 mg /3 mL (0.083 %) nebulizer solution; Take 3 mLs (2.5 mg total) by nebulization every 6 (six) hours as needed for Wheezing. Rescue  Dispense: 25 each; Refill: 5    3. Tobacco use  Comments:  - Cut down from 1-1.5 packs per day to 4 cigarettes per day  - Interested in quitting at this time  - Discussed patch and colleen to help quit  Orders:  -     nicotine (NICODERM CQ) 14 mg/24 hr; Place 1 patch onto the skin once daily.  Dispense: 7 patch; Refill: 0    4. Anxiety  Comments:  - Start zoloft 50mg 1/2 tablet for 2 weeks, then 1 whole tablet  - Follow 2 weeks  Orders:  -     sertraline (ZOLOFT) 50 MG tablet; Take 1 tablet (50 mg total) by mouth once daily.  Dispense: 30 tablet; Refill: 11         Discussed how to stay healthy including: diet, exercise, refraining from smoking and discussed screening exams / tests needed for age, sex and family Hx.    Spent 15 minutes on smoking cessation with patient.    RTC 2 weeks    Aura Gaston PA-C

## 2024-01-23 NOTE — PATIENT INSTRUCTIONS
Albuterol with nebulizer  Montelukast daily  Zoloft 1/2 tab for 2 weeks, then whole tab  Nicotine patch while cutting down  Follow 2 weeks

## 2024-02-06 ENCOUNTER — OFFICE VISIT (OUTPATIENT)
Dept: FAMILY MEDICINE | Facility: CLINIC | Age: 29
End: 2024-02-06
Payer: MEDICAID

## 2024-02-06 VITALS
HEART RATE: 85 BPM | WEIGHT: 259.81 LBS | TEMPERATURE: 98 F | DIASTOLIC BLOOD PRESSURE: 72 MMHG | OXYGEN SATURATION: 98 % | BODY MASS INDEX: 35.19 KG/M2 | SYSTOLIC BLOOD PRESSURE: 120 MMHG | HEIGHT: 72 IN

## 2024-02-06 DIAGNOSIS — Z72.0 TOBACCO USE: ICD-10-CM

## 2024-02-06 DIAGNOSIS — F41.9 ANXIETY: Primary | ICD-10-CM

## 2024-02-06 DIAGNOSIS — J45.909 ASTHMA, UNSPECIFIED ASTHMA SEVERITY, UNSPECIFIED WHETHER COMPLICATED, UNSPECIFIED WHETHER PERSISTENT: ICD-10-CM

## 2024-02-06 PROCEDURE — 3074F SYST BP LT 130 MM HG: CPT | Mod: CPTII,S$GLB,, | Performed by: PHYSICIAN ASSISTANT

## 2024-02-06 PROCEDURE — 99406 BEHAV CHNG SMOKING 3-10 MIN: CPT | Mod: S$GLB,,, | Performed by: PHYSICIAN ASSISTANT

## 2024-02-06 PROCEDURE — 99214 OFFICE O/P EST MOD 30 MIN: CPT | Mod: 25,S$GLB,, | Performed by: PHYSICIAN ASSISTANT

## 2024-02-06 PROCEDURE — 3008F BODY MASS INDEX DOCD: CPT | Mod: CPTII,S$GLB,, | Performed by: PHYSICIAN ASSISTANT

## 2024-02-06 PROCEDURE — 1160F RVW MEDS BY RX/DR IN RCRD: CPT | Mod: CPTII,S$GLB,, | Performed by: PHYSICIAN ASSISTANT

## 2024-02-06 PROCEDURE — 3078F DIAST BP <80 MM HG: CPT | Mod: CPTII,S$GLB,, | Performed by: PHYSICIAN ASSISTANT

## 2024-02-06 PROCEDURE — 1159F MED LIST DOCD IN RCRD: CPT | Mod: CPTII,S$GLB,, | Performed by: PHYSICIAN ASSISTANT

## 2024-02-06 RX ORDER — SERTRALINE HYDROCHLORIDE 50 MG/1
50 TABLET, FILM COATED ORAL DAILY
Qty: 90 TABLET | Refills: 3 | Status: SHIPPED | OUTPATIENT
Start: 2024-02-06 | End: 2025-02-05

## 2024-02-06 RX ORDER — NICOTINE 7MG/24HR
1 PATCH, TRANSDERMAL 24 HOURS TRANSDERMAL DAILY
Qty: 28 PATCH | Refills: 3 | Status: SHIPPED | OUTPATIENT
Start: 2024-02-06 | End: 2024-02-20

## 2024-02-06 NOTE — PROGRESS NOTES
Patient ID: Eduin Ugarte is a 28 y.o. male.     Chief Complaint: Follow-up (2 week)    28 year old male with history of asthma with recent acute exacerbation and anxiety presents to clinic for 2 week follow up. Patient has since finished steroid pack and started zoloft. He admits to improvement in asthma symptoms and denies coughing attack since treatment. Patient admits he was unable to  nebulizer due to price. Admits he has increased smoking to 1/2 pack daily. Has tried nicotine patches, though admits they make him sick. Denies SOB, CP, fever, SI/HI. Denies using OTC therapies in addition.         Review of Systems  Review of Systems   Constitutional:  Negative for fever.   HENT:  Negative for ear pain and sinus pain.    Eyes:  Negative for discharge.   Respiratory:  Negative for cough and wheezing.    Cardiovascular:  Negative for chest pain and leg swelling.   Gastrointestinal:  Negative for diarrhea, nausea and vomiting.   Genitourinary:  Negative for urgency.   Musculoskeletal:  Negative for myalgias.   Skin:  Negative for rash.   Neurological:  Negative for weakness and headaches.   Psychiatric/Behavioral:  Negative for depression.        Currently Medications  Current Outpatient Medications on File Prior to Visit   Medication Sig Dispense Refill    albuterol (PROVENTIL) 2.5 mg /3 mL (0.083 %) nebulizer solution Take 3 mLs (2.5 mg total) by nebulization every 6 (six) hours as needed for Wheezing. Rescue 25 each 5    albuterol (PROVENTIL/VENTOLIN HFA) 90 mcg/actuation inhaler Inhale 1-2 puffs into the lungs every 6 (six) hours as needed for Wheezing. Rescue 18 g 3    benzonatate (TESSALON) 100 MG capsule Take 2 capsules (200 mg total) by mouth 3 (three) times daily as needed for Cough. 20 capsule 0    LIDOcaine (LIDODERM) 5 % Place 1 patch onto the skin once daily. Remove & Discard patch within 12 hours or as directed by MD 15 patch 0    montelukast (SINGULAIR) 10 mg tablet Take 1 tablet (10 mg  total) by mouth every evening. 30 tablet 0    nicotine (NICODERM CQ) 14 mg/24 hr Place 1 patch onto the skin once daily. 7 patch 0    [DISCONTINUED] sertraline (ZOLOFT) 50 MG tablet Take 1 tablet (50 mg total) by mouth once daily. 30 tablet 11     No current facility-administered medications on file prior to visit.       Physical  Exam  Vitals:    02/06/24 0936   BP: 120/72   BP Location: Right arm   Patient Position: Sitting   Pulse: 85   Temp: 97.9 °F (36.6 °C)   SpO2: 98%   Weight: 117.8 kg (259 lb 13 oz)   Height: 6' (1.829 m)      Body mass index is 35.24 kg/m².  Wt Readings from Last 3 Encounters:   02/06/24 117.8 kg (259 lb 13 oz)   01/23/24 116.8 kg (257 lb 8 oz)   08/27/22 127 kg (280 lb)       Physical Exam  Vitals and nursing note reviewed.   Constitutional:       General: He is not in acute distress.     Appearance: He is not ill-appearing.   HENT:      Head: Normocephalic and atraumatic.      Right Ear: External ear normal.      Left Ear: External ear normal.      Nose: Nose normal.      Mouth/Throat:      Mouth: Mucous membranes are moist.   Eyes:      Extraocular Movements: Extraocular movements intact.      Conjunctiva/sclera: Conjunctivae normal.   Cardiovascular:      Rate and Rhythm: Normal rate and regular rhythm.      Pulses: Normal pulses.      Heart sounds: No murmur heard.  Pulmonary:      Effort: Pulmonary effort is normal. No respiratory distress.      Breath sounds: Wheezing (diffuse) present.      Comments: Malodorous of smoke  Abdominal:      General: There is no distension.      Palpations: Abdomen is soft. There is no mass.      Tenderness: There is no abdominal tenderness.   Musculoskeletal:         General: No swelling.      Cervical back: Normal range of motion.   Skin:     Coloration: Skin is not jaundiced.      Findings: No rash.   Neurological:      General: No focal deficit present.      Mental Status: He is alert and oriented to person, place, and time.   Psychiatric:          "Mood and Affect: Mood normal.         Thought Content: Thought content normal.         Labs:    Complete Blood Count  Lab Results   Component Value Date    RBC 5.19 06/17/2019    HGB 15.9 06/17/2019    HCT 46.6 06/17/2019    MCV 89.8 06/17/2019    MCH 30.6 06/17/2019    MCHC 34.1 06/17/2019    RDW 12.8 06/17/2019     06/17/2019    MPV 9.7 06/17/2019    LYMPH 2,323 06/17/2019    LYMPH 20.2 06/17/2019    MONO 805 06/17/2019    MONO 7.0 06/17/2019     06/17/2019    BASO 115 06/17/2019    EOSINOPHIL 3.9 06/17/2019    BASOPHIL 1.0 06/17/2019       Comprehensive Metabolic Panel  No results found for: "GLU", "BUN", "CREATININE", "NA", "K", "CL", "PROT", "ALBUMIN", "BILITOT", "AST", "ALKPHOS", "CO2", "ALT", "ANIONGAP", "EGFRNONAA", "ESTGFRAFRICA"    TSH  No results found for: "TSH"    Imaging:  X-Ray Chest AP Portable  Narrative: EXAMINATION:  XR CHEST AP PORTABLE    CLINICAL HISTORY:  Shortness of breath    TECHNIQUE:  Single frontal view of the chest was performed.    COMPARISON:  None    FINDINGS:  The cardiac silhouette is normal in size.  The pulmonary vascularity is normal.    The lungs are normally inflated.    Small area of increased attenuation left lung base most suggestive of subsegmental atelectasis or minimal airspace disease.    No pleural effusion.  No pneumothorax.    The osseous structures appear normal.  Impression: Small area of increased attenuation left lung base possibly favoring minimal atelectasis or airspace disease.    Electronically signed by: Nanette Grayson MD  Date:    01/12/2024  Time:    12:15      Assessment/Plan:    1. Anxiety  Comments:  - Continue with zoloft  - Follow as needed  Orders:  -     nicotine (NICODERM CQ) 7 mg/24 hr; Place 1 patch onto the skin once daily. for 14 days  Dispense: 28 patch; Refill: 3  -     sertraline (ZOLOFT) 50 MG tablet; Take 1 tablet (50 mg total) by mouth once daily.  Dispense: 90 tablet; Refill: 3    2. Asthma, unspecified asthma " severity, unspecified whether complicated, unspecified whether persistent  Comments:  - Continue to try to quit smoking  - Continue using albuterol as needed  - Follow if worsening symptoms    3. Tobacco use  -     sertraline (ZOLOFT) 50 MG tablet; Take 1 tablet (50 mg total) by mouth once daily.  Dispense: 90 tablet; Refill: 3         Discussed how to stay healthy including: diet, exercise, refraining from smoking and discussed screening exams / tests needed for age, sex and family Hx.    RTC PRN    Aura Gaston PA-C

## 2024-02-27 ENCOUNTER — HOSPITAL ENCOUNTER (EMERGENCY)
Facility: HOSPITAL | Age: 29
Discharge: HOME | End: 2024-02-27
Attending: EMERGENCY MEDICINE
Payer: MEDICAID

## 2024-02-27 VITALS
RESPIRATION RATE: 20 BRPM | HEART RATE: 92 BPM | WEIGHT: 259.81 LBS | OXYGEN SATURATION: 97 % | SYSTOLIC BLOOD PRESSURE: 140 MMHG | DIASTOLIC BLOOD PRESSURE: 97 MMHG | BODY MASS INDEX: 35.19 KG/M2 | HEIGHT: 72 IN | TEMPERATURE: 99 F

## 2024-02-27 DIAGNOSIS — H66.001 NON-RECURRENT ACUTE SUPPURATIVE OTITIS MEDIA OF RIGHT EAR WITHOUT SPONTANEOUS RUPTURE OF TYMPANIC MEMBRANE: Primary | ICD-10-CM

## 2024-02-27 PROCEDURE — 25000003 PHARM REV CODE 250: Mod: ER | Performed by: EMERGENCY MEDICINE

## 2024-02-27 PROCEDURE — 99284 EMERGENCY DEPT VISIT MOD MDM: CPT | Mod: ER

## 2024-02-27 RX ORDER — CETIRIZINE HYDROCHLORIDE 10 MG/1
10 TABLET ORAL DAILY
Qty: 30 TABLET | Refills: 0 | COMMUNITY
Start: 2024-02-27 | End: 2025-02-26

## 2024-02-27 RX ORDER — FLUTICASONE PROPIONATE 50 MCG
1 SPRAY, SUSPENSION (ML) NASAL 2 TIMES DAILY PRN
Qty: 15 G | Refills: 0 | Status: SHIPPED | OUTPATIENT
Start: 2024-02-27 | End: 2024-03-08

## 2024-02-27 RX ORDER — CEFDINIR 300 MG/1
300 CAPSULE ORAL 2 TIMES DAILY
Qty: 20 CAPSULE | Refills: 0 | Status: SHIPPED | OUTPATIENT
Start: 2024-02-27 | End: 2024-03-08

## 2024-02-27 RX ORDER — KETOROLAC TROMETHAMINE 10 MG/1
10 TABLET, FILM COATED ORAL
Status: COMPLETED | OUTPATIENT
Start: 2024-02-27 | End: 2024-02-27

## 2024-02-27 RX ORDER — KETOROLAC TROMETHAMINE 10 MG/1
10 TABLET, FILM COATED ORAL EVERY 8 HOURS PRN
Qty: 15 TABLET | Refills: 0 | Status: SHIPPED | OUTPATIENT
Start: 2024-02-27

## 2024-02-27 RX ADMIN — KETOROLAC TROMETHAMINE 10 MG: 10 TABLET, FILM COATED ORAL at 05:02

## 2024-02-27 NOTE — ED PROVIDER NOTES
ED Provider Note - 2/27/2024    History     Chief Complaint   Patient presents with    Otalgia     Reports to ED c c/o R sided ear pain since 0230 this am. Denies fever. +Tinnitus.      Patient currently presents with complaint of ear pain.  This is localized to the right ear(s).  Onset was noted this AM.  There has not been associated drainage.  There have been associated upper respiratory symptoms.  Hearing loss is not noted.  There is not suspected FB.          Review of patient's allergies indicates:   Allergen Reactions    Ether for anesthesia Other (See Comments)     Popped blood vessel in eye       Past Medical History:   Diagnosis Date    Anxiety disorder, unspecified     Asthma     Depression      Past Surgical History:   Procedure Laterality Date    KNEE SURGERY Left      Family History   Problem Relation Age of Onset    Hypertension Father     Arthritis Father     Cancer Maternal Grandmother     Cancer Paternal Grandmother     Diabetes Maternal Grandfather     Heart disease Maternal Grandfather      Social History     Tobacco Use    Smoking status: Every Day     Current packs/day: 0.50     Average packs/day: 0.5 packs/day for 1 year (0.5 ttl pk-yrs)     Types: Cigarettes     Passive exposure: Never    Smokeless tobacco: Never   Substance Use Topics    Alcohol use: No    Drug use: No     Review of Systems   Constitutional:  Negative for chills and fever.   HENT:  Positive for congestion, ear pain and postnasal drip. Negative for ear discharge and sore throat.    Respiratory:  Negative for chest tightness and shortness of breath.    Cardiovascular:  Negative for chest pain and palpitations.   Gastrointestinal:  Negative for abdominal pain and vomiting.   Genitourinary:  Negative for difficulty urinating and dysuria.   Skin:  Negative for color change and rash.   Neurological:  Negative for weakness and numbness.   All other systems reviewed and are negative.      Physical Exam     Initial Vitals [02/27/24  0538]   BP Pulse Resp Temp SpO2   (!) 139/91 95 19 98.6 °F (37 °C) 95 %      MAP       --         Physical Exam    Nursing note and vitals reviewed.  Constitutional: He appears well-developed and well-nourished. He is not diaphoretic. No distress.   HENT:   Head: Normocephalic and atraumatic.   Right Ear: Hearing, external ear and ear canal normal. No swelling. Tympanic membrane is erythematous and bulging. Tympanic membrane mobility is abnormal. A middle ear effusion is present.   Left Ear: Hearing, tympanic membrane, external ear and ear canal normal.   Nose: Nose normal.   Mouth/Throat: Oropharynx is clear and moist.   Eyes: Conjunctivae are normal. No scleral icterus.   Cardiovascular:  Normal rate, regular rhythm, normal heart sounds and intact distal pulses.           Pulmonary/Chest: Breath sounds normal. No respiratory distress.   Abdominal: Abdomen is soft. There is no abdominal tenderness.   Musculoskeletal:         General: No edema. Normal range of motion.     Neurological: He is alert and oriented to person, place, and time. He has normal strength.   Skin: Skin is warm and dry.       ED Course   Procedures                   MDM  Differential Diagnoses   Based on available history, the working differential diagnoses considered during this evaluation include but are not limited to acute otitis media, acute serous otitis, external otitis, cerumen impaction, foreign body..      LABS     Labs Reviewed - No data to display             Imaging     Imaging Results    None                EKG        ED Management/Discussion     Medications   ketorolac tablet 10 mg (has no administration in time range)                   The patient's list of active medical problems, social history, medications, and allergies as documented per RN staff has been reviewed.                 On final assessment, the patient appears suitable for discharge.  I see no indication of an emergent process beyond that addressed during our  encounter but have duly counseled the patient/family regarding the need for prompt follow-up as well as the indications that should prompt immediate return to the emergency room.  The patient/family has been provided with language -specific verbal and printed direction regarding our final diagnosis(es) as well as instructions regarding use of OTC and/or Rx medications intended to manage the patient's aforementioned conditions including:  ED Prescriptions       Medication Sig Dispense Start Date End Date Auth. Provider    cetirizine (ZYRTEC) 10 MG tablet Take 1 tablet (10 mg total) by mouth once daily. 30 tablet 2/27/2024 2/26/2025 Jose Luis Villarreal MD    fluticasone propionate (FLONASE) 50 mcg/actuation nasal spray 1 spray (50 mcg total) by Each Nostril route 2 (two) times daily as needed for Rhinitis. 15 g 2/27/2024 3/8/2024 Jose Luis Villarreal MD    cefdinir (OMNICEF) 300 MG capsule Take 1 capsule (300 mg total) by mouth 2 (two) times daily. for 10 days 20 capsule 2/27/2024 3/8/2024 Jose Luis Villarreal MD    ketorolac (TORADOL) 10 mg tablet Take 1 tablet (10 mg total) by mouth every 8 (eight) hours as needed for Pain. 15 tablet 2/27/2024 -- Jose Luis Villarreal MD              Patient has been advised of the following recommended follow-up instructions:  Follow-up Information       Follow up With Specialties Details Why Contact Info    Oli Mendiola MD Family Medicine Schedule an appointment as soon as possible for a visit  for reassessment [address] [place] [address] [phone]      [facility] - Emergency Dept Emergency Medicine Go to  As needed, If symptoms worsen [address] [address] [phone]          The patient/family communicates understanding of all this information and all remaining questions and concerns were addressed at this time.      Referrals:  No orders of the defined types were placed in this encounter.      CLINICAL IMPRESSION    ICD-10-CM  ICD-9-CM   1. Non-recurrent acute suppurative otitis media of right ear without spontaneous rupture of tympanic membrane  H66.001 382.00          ED Disposition Condition    Discharge Stable                 Jose Luis Villarreal MD  02/27/24 0550

## 2024-03-04 ENCOUNTER — TELEPHONE (OUTPATIENT)
Dept: FAMILY MEDICINE | Facility: CLINIC | Age: 29
End: 2024-03-04
Payer: MEDICAID

## 2024-03-04 DIAGNOSIS — J45.909 ASTHMA, UNSPECIFIED ASTHMA SEVERITY, UNSPECIFIED WHETHER COMPLICATED, UNSPECIFIED WHETHER PERSISTENT: Primary | ICD-10-CM

## 2024-03-04 RX ORDER — MONTELUKAST SODIUM 10 MG/1
10 TABLET ORAL NIGHTLY
Qty: 30 TABLET | Refills: 11 | Status: SHIPPED | OUTPATIENT
Start: 2024-03-04

## 2024-03-04 NOTE — TELEPHONE ENCOUNTER
Received a refill request for montelukast 10 mg for a 30 day supply with 11 refills to be sent to the [facility] in [place].

## 2024-03-29 ENCOUNTER — HOSPITAL ENCOUNTER (EMERGENCY)
Facility: HOSPITAL | Age: 29
Discharge: HOME | End: 2024-03-30
Attending: STUDENT IN AN ORGANIZED HEALTH CARE EDUCATION/TRAINING PROGRAM
Payer: MEDICAID

## 2024-03-29 DIAGNOSIS — R30.0 DYSURIA: ICD-10-CM

## 2024-03-29 DIAGNOSIS — N20.0 NEPHROLITHIASIS: ICD-10-CM

## 2024-03-29 DIAGNOSIS — R31.9 HEMATURIA, UNSPECIFIED TYPE: Primary | ICD-10-CM

## 2024-03-29 LAB
ALBUMIN SERPL BCP-MCNC: 3.8 G/DL (ref 3.5–5.2)
ALP SERPL-CCNC: 59 U/L (ref 55–135)
ALT SERPL W/O P-5'-P-CCNC: 26 U/L (ref 10–44)
ANION GAP SERPL CALC-SCNC: 11 MMOL/L (ref 8–16)
AST SERPL-CCNC: 19 U/L (ref 10–40)
BACTERIA #/AREA URNS HPF: ABNORMAL /HPF
BASOPHILS # BLD AUTO: 0.09 K/UL (ref 0–0.2)
BASOPHILS NFR BLD: 0.7 % (ref 0–1.9)
BILIRUB SERPL-MCNC: 0.2 MG/DL (ref 0.1–1)
BILIRUB UR QL STRIP: NEGATIVE
BUN SERPL-MCNC: 15 MG/DL (ref 6–20)
CALCIUM SERPL-MCNC: 8.7 MG/DL (ref 8.7–10.5)
CHLORIDE SERPL-SCNC: 107 MMOL/L (ref 95–110)
CLARITY UR: ABNORMAL
CO2 SERPL-SCNC: 23 MMOL/L (ref 23–29)
COLOR UR: YELLOW
CREAT SERPL-MCNC: 0.8 MG/DL (ref 0.5–1.4)
DIFFERENTIAL METHOD BLD: ABNORMAL
EOSINOPHIL # BLD AUTO: 0.5 K/UL (ref 0–0.5)
EOSINOPHIL NFR BLD: 4.3 % (ref 0–8)
ERYTHROCYTE [DISTWIDTH] IN BLOOD BY AUTOMATED COUNT: 13 % (ref 11.5–14.5)
EST. GFR  (NO RACE VARIABLE): >60 ML/MIN/1.73 M^2
GLUCOSE SERPL-MCNC: 83 MG/DL (ref 70–110)
GLUCOSE UR QL STRIP: NEGATIVE
HCT VFR BLD AUTO: 41.8 % (ref 40–54)
HGB BLD-MCNC: 15.1 G/DL (ref 14–18)
HGB UR QL STRIP: ABNORMAL
IMM GRANULOCYTES # BLD AUTO: 0.08 K/UL (ref 0–0.04)
IMM GRANULOCYTES NFR BLD AUTO: 0.6 % (ref 0–0.5)
INR PPP: 1 (ref 0.8–1.2)
KETONES UR QL STRIP: NEGATIVE
LEUKOCYTE ESTERASE UR QL STRIP: ABNORMAL
LYMPHOCYTES # BLD AUTO: 2.6 K/UL (ref 1–4.8)
LYMPHOCYTES NFR BLD: 21.3 % (ref 18–48)
MCH RBC QN AUTO: 31.3 PG (ref 27–31)
MCHC RBC AUTO-ENTMCNC: 36.1 G/DL (ref 32–36)
MCV RBC AUTO: 87 FL (ref 82–98)
MICROSCOPIC COMMENT: ABNORMAL
MONOCYTES # BLD AUTO: 0.9 K/UL (ref 0.3–1)
MONOCYTES NFR BLD: 7.6 % (ref 4–15)
NEUTROPHILS # BLD AUTO: 8.1 K/UL (ref 1.8–7.7)
NEUTROPHILS NFR BLD: 65.5 % (ref 38–73)
NITRITE UR QL STRIP: NEGATIVE
NRBC BLD-RTO: 0 /100 WBC
PH UR STRIP: 6 [PH] (ref 5–8)
PLATELET # BLD AUTO: 254 K/UL (ref 150–450)
PMV BLD AUTO: 9.6 FL (ref 9.2–12.9)
POTASSIUM SERPL-SCNC: 3.8 MMOL/L (ref 3.5–5.1)
PROT SERPL-MCNC: 6.9 G/DL (ref 6–8.4)
PROT UR QL STRIP: ABNORMAL
PROTHROMBIN TIME: 11 SEC (ref 9–12.5)
RBC # BLD AUTO: 4.83 M/UL (ref 4.6–6.2)
RBC #/AREA URNS HPF: >100 /HPF (ref 0–4)
SODIUM SERPL-SCNC: 141 MMOL/L (ref 136–145)
SP GR UR STRIP: 1.02 (ref 1–1.03)
SQUAMOUS #/AREA URNS HPF: 4 /HPF
URN SPEC COLLECT METH UR: ABNORMAL
UROBILINOGEN UR STRIP-ACNC: NEGATIVE EU/DL
WBC # BLD AUTO: 12.39 K/UL (ref 3.9–12.7)
WBC #/AREA URNS HPF: 7 /HPF (ref 0–5)

## 2024-03-29 PROCEDURE — 81000 URINALYSIS NONAUTO W/SCOPE: CPT | Performed by: STUDENT IN AN ORGANIZED HEALTH CARE EDUCATION/TRAINING PROGRAM

## 2024-03-29 PROCEDURE — 99285 EMERGENCY DEPT VISIT HI MDM: CPT | Mod: 25

## 2024-03-29 PROCEDURE — 25000003 PHARM REV CODE 250: Performed by: STUDENT IN AN ORGANIZED HEALTH CARE EDUCATION/TRAINING PROGRAM

## 2024-03-29 PROCEDURE — 96361 HYDRATE IV INFUSION ADD-ON: CPT

## 2024-03-29 PROCEDURE — 80053 COMPREHEN METABOLIC PANEL: CPT | Performed by: STUDENT IN AN ORGANIZED HEALTH CARE EDUCATION/TRAINING PROGRAM

## 2024-03-29 PROCEDURE — 85025 COMPLETE CBC W/AUTO DIFF WBC: CPT | Performed by: STUDENT IN AN ORGANIZED HEALTH CARE EDUCATION/TRAINING PROGRAM

## 2024-03-29 PROCEDURE — 85610 PROTHROMBIN TIME: CPT | Performed by: STUDENT IN AN ORGANIZED HEALTH CARE EDUCATION/TRAINING PROGRAM

## 2024-03-29 PROCEDURE — 96374 THER/PROPH/DIAG INJ IV PUSH: CPT

## 2024-03-29 PROCEDURE — 63600175 PHARM REV CODE 636 W HCPCS: Performed by: STUDENT IN AN ORGANIZED HEALTH CARE EDUCATION/TRAINING PROGRAM

## 2024-03-29 RX ORDER — KETOROLAC TROMETHAMINE 30 MG/ML
15 INJECTION, SOLUTION INTRAMUSCULAR; INTRAVENOUS
Status: COMPLETED | OUTPATIENT
Start: 2024-03-29 | End: 2024-03-29

## 2024-03-29 RX ADMIN — KETOROLAC TROMETHAMINE 15 MG: 30 INJECTION, SOLUTION INTRAMUSCULAR at 09:03

## 2024-03-29 RX ADMIN — SODIUM CHLORIDE 1000 ML: 9 INJECTION, SOLUTION INTRAVENOUS at 09:03

## 2024-03-30 VITALS
SYSTOLIC BLOOD PRESSURE: 119 MMHG | OXYGEN SATURATION: 96 % | RESPIRATION RATE: 18 BRPM | DIASTOLIC BLOOD PRESSURE: 70 MMHG | HEART RATE: 72 BPM | WEIGHT: 260 LBS | TEMPERATURE: 98 F | HEIGHT: 72 IN | BODY MASS INDEX: 35.21 KG/M2

## 2024-03-30 RX ORDER — KETOROLAC TROMETHAMINE 10 MG/1
10 TABLET, FILM COATED ORAL EVERY 8 HOURS
Qty: 15 TABLET | Refills: 0 | Status: SHIPPED | OUTPATIENT
Start: 2024-03-30 | End: 2024-04-04

## 2024-03-30 NOTE — ED PROVIDER NOTES
ED Provider Note - 3/29/2024    History     Chief Complaint   Patient presents with    Hematuria     Pain and bleeding with urination that started this morning x 2 episodes. History of UTI and kidney stones.       HPI     Eduin Ugarte is a 28 y.o. year old male with past medical and surgical history as seen below, presenting with chief complaint of hematuria.  Two episodes today.  Dysuria at onset of stream.  Began having some lower abdominal pain following the 2nd episode this evening.  Prior history of kidney stones.  No fever or chills.  Denying flank pain, nausea, vomiting.      Past Medical History:   Diagnosis Date    Anxiety disorder, unspecified     Asthma     Depression      Past Surgical History:   Procedure Laterality Date    KNEE SURGERY Left          Family History   Problem Relation Age of Onset    Hypertension Father     Arthritis Father     Cancer Maternal Grandmother     Cancer Paternal Grandmother     Diabetes Maternal Grandfather     Heart disease Maternal Grandfather      Social History     Tobacco Use    Smoking status: Every Day     Current packs/day: 0.50     Average packs/day: 0.5 packs/day for 1 year (0.5 ttl pk-yrs)     Types: Cigarettes     Passive exposure: Never    Smokeless tobacco: Never   Substance Use Topics    Alcohol use: No    Drug use: No     Social Determinants of Health with Concerns     Tobacco Use: High Risk (2/27/2024)    Patient History     Smoking Tobacco Use: Every Day     Smokeless Tobacco Use: Never     Passive Exposure: Never   Financial Resource Strain: High Risk (4/12/2022)    Overall Financial Resource Strain (CARDIA)     Difficulty of Paying Living Expenses: Very hard   Physical Activity: Inactive (4/12/2022)    Exercise Vital Sign     Days of Exercise per Week: 0 days     Minutes of Exercise per Session: 0 min   Stress: Stress Concern Present (4/12/2022)    [employer] - Occupational Stress Questionnaire     Feeling of Stress :  Very much   Social Connections: Moderately Isolated (4/12/2022)    Social Connection and Isolation Panel [NHANES]     Frequency of Communication with Friends and Family: Twice a week     Frequency of Social Gatherings with Friends and Family: Twice a week     Attends [employer] Services: 1 to 4 times per year     Active Member of Clubs or Organizations: No     Attends Club or Organization Meetings: Never     Marital Status: Never       Review of patient's allergies indicates:   Allergen Reactions    Ether for anesthesia Other (See Comments)     Popped blood vessel in eye         Review of Systems     A full Review of Systems (ROS) was performed and was negative unless otherwise stated in the HPI.      Physical Exam     Vitals:    03/29/24 2058 03/29/24 2137 03/29/24 2321 03/30/24 0051   BP: 111/86  105/61 119/70   BP Location:    Left arm   Patient Position:   Sitting Sitting   Pulse: 85  62 72   Resp: 18  18 18   Temp: 97.8 °F (36.6 °C)  98 °F (36.7 °C) 97.9 °F (36.6 °C)   TempSrc: Oral  Oral Oral   SpO2: 98%  95% 96%   Weight:  117.9 kg (260 lb)     Height:  6' (1.829 m)          Physical Exam    Nursing note and vitals reviewed.  Constitutional: He appears well-developed and well-nourished. No distress.   HENT:   Head: Normocephalic and atraumatic.   Right Ear: External ear normal.   Left Ear: External ear normal.   Nose: Nose normal.   Mouth/Throat: Oropharynx is clear and moist.   Eyes: Conjunctivae and EOM are normal. Pupils are equal, round, and reactive to light.   Neck: Neck supple.   Normal range of motion.  Cardiovascular:  Normal rate and regular rhythm.           No murmur heard.  Pulmonary/Chest: Breath sounds normal. No stridor. No respiratory distress. He has no wheezes. He has no rhonchi. He has no rales.   Abdominal: Abdomen is soft. Bowel sounds are normal. There is no abdominal tenderness.   Musculoskeletal:         General: No edema. Normal range of motion.      Cervical back: Normal range  of motion and neck supple.     Neurological: He is alert and oriented to person, place, and time. He has normal strength. No cranial nerve deficit or sensory deficit.   Skin: Skin is warm and dry. No rash noted.   Psychiatric: He has a normal mood and affect. Thought content normal.         Lab Results- Independently reviewed by myself      Labs Reviewed   URINALYSIS, REFLEX TO URINE CULTURE - Abnormal; Notable for the following components:       Result Value    Appearance, UA Hazy (*)     Protein, UA Trace (*)     Occult Blood UA 3+ (*)     Leukocytes, UA 1+ (*)     All other components within normal limits    Narrative:     Specimen Source->Urine   URINALYSIS MICROSCOPIC - Abnormal; Notable for the following components:    RBC, UA >100 (*)     WBC, UA 7 (*)     All other components within normal limits    Narrative:     Specimen Source->Urine   CBC W/ AUTO DIFFERENTIAL - Abnormal; Notable for the following components:    MCH 31.3 (*)     MCHC 36.1 (*)     Immature Granulocytes 0.6 (*)     Gran # (ANC) 8.1 (*)     Immature Grans (Abs) 0.08 (*)     All other components within normal limits   COMPREHENSIVE METABOLIC PANEL   PROTIME-INR           Imaging     Imaging Results              CT Renal Stone Study ABD Pelvis WO (Final result)  Result time 03/30/24 00:01:14      Final result by Michael Coronel MD (03/30/24 00:01:14)                   Impression:      Nonobstructing stone in the lower pole of the right kidney.  No obstructive uropathy.    Fat and soft tissue density lesion in the right upper quadrant of the abdomen adjacent to the liver and right kidney, favored to represent a focus of intraperitoneal focal fat infarction/omental infarction.  Renal lesion or early liposarcoma felt less likely.  Outpatient contrast enhanced CT follow-up could be helpful to exclude intralesional enhancement in this patient with hematuria (though this is most likely related to right-sided nephrolithiasis).    Colonic  diverticulosis.    Mild splenomegaly.      Electronically signed by: Michael Coronel MD  Date:    03/30/2024  Time:    00:01               Narrative:    EXAMINATION:  CT RENAL STONE STUDY ABD PELVIS WO    CLINICAL HISTORY:  Flank pain, kidney stone suspected;    TECHNIQUE:  Low dose axial images, sagittal and coronal reformations were obtained from the lung bases to the pubic symphysis. Oral and IV contrast not administered.    COMPARISON:  None.    FINDINGS:  Lower chest: Heart size is normal. Lung bases are essentially clear. No pleural or pericardial effusion.    Abdomen:    Evaluation of the solid abdominal organs and bowel is limited in the absence of IV contrast.    Liver is normal in size and contour without focal contour deforming lesion. Gallbladder is decompressed but otherwise unremarkable.  No intra-or extrahepatic biliary ductal dilatation.    Spleen is mildly enlarged.  Adrenal glands and pancreas are unremarkable.    Kidneys are symmetric.  4 mm nonobstructing stone in the lower pole of the right kidney.  No hydronephrosis.  No ureteral stones.  No asymmetric perinephric fat stranding.    No small bowel obstruction.  Normal appendix.  No significant stool burden in the colon.  Few colonic diverticula without evidence of acute diverticulitis.    No abdominal free fluid or pneumoperitoneum.  Rounded fat and soft tissue density lesion in the right upper quadrant of the abdomen abutting the right kidney and inferior right hepatic lobe, measuring approximately 2.5 cm in size (axial image 66 and coronal image 100).  This finding abuts the right kidney but is favored to represent a focus of intraperitoneal focal fat infarction/omental infarction rather than a true renal lesion.    Abdominal aorta is normal in course and caliber.    No bulky retroperitoneal lymphadenopathy.    Pelvis: Urinary bladder, rectum, and pelvic organs are unremarkable.  No free fluid in the pelvis.    Bones and soft tissues: No  aggressive osseous lesions. Degenerative changes in the spine.  Multiple disc bulges at L3-L4, L4-L5, and L5-S1.  Probable central canal stenosis at L3-L4.  Extraperitoneal soft tissues are negative for acute finding.                                    X-Rays:   Independently Interpreted Readings:   Abdomen: Kidney(s): Stone(s) present - right kidney(s).                ED Course         Procedures         Orders Placed This Encounter    CT Renal Stone Study ABD Pelvis WO    Urinalysis, Reflex to Urine Culture Urine, Clean Catch    Urinalysis Microscopic    CBC auto differential    Comprehensive metabolic panel    Protime-INR    Ambulatory referral/consult to Urology    Insert Saline lock IV    sodium chloride 0.9% bolus 1,000 mL 1,000 mL    ketorolac injection 15 mg    ketorolac (TORADOL) 10 mg tablet          ED Course as of 03/30/24 0408   Fri Mar 29, 2024   2317 Comprehensive metabolic panel  CMP: Normal electrolytes, renal function, liver enzymes   [KB]   Sat Mar 30, 2024   0027 CBC auto differential(!)  CBC: No significant anemia, platelet disorder, or leukocytosis.   [KB]      ED Course User Index  [KB] Carroll Hart MD              Medical Decision Making       The patient's list of active medical problems, social history, medications, and allergies as documented per RN staff has been reviewed.           Medical Decision Making  28-year-old male presents with hematuria.  No urinary retention.  Urinalysis with only leukocytes, no nitrites, few white blood cells and only rare bacteria.  Not very impressive in regards to possible infection.  CT scan showing nephrolithiasis without urolithiasis or obstructing stone.  No signs of pyelonephritis on labs, exam, or CT.  Incidental finding noted and discussed with the patient.  Urology referral placed for follow-up.  Patient otherwise will follow up with PCP.  Return to ED precautions given for worsening or changing symptoms.    Amount and/or Complexity of Data  Reviewed  Labs: ordered. Decision-making details documented in ED Course.  Radiology: ordered and independent interpretation performed.    Risk  Prescription drug management.         Additional MDM:   Differential Diagnosis:   Symptom: Abdominal pain. <> The follow diagnoses were considered and will be evaluated: Bowel Obstruction, Gastroenteritis, Gastroparesis, Ileus, Pancreatitis, Peritonitis, Pyelonephritis, Renal Stone and Ureteral Calculus.                ED Prescriptions       Medication Sig Dispense Start Date End Date Auth. Provider    ketorolac (TORADOL) 10 mg tablet Take 1 tablet (10 mg total) by mouth every 8 (eight) hours. for 5 days 15 tablet 3/30/2024 4/4/2024 Carroll Hart MD              Clinical Impression       Follow-up Information       Follow up With Specialties Details Why Contact Info Additional Information    [facility] Urology Schedule an appointment as soon as possible for a visit in 5 days For follow-up on today's visit. [address] [facility] [place]  [address]  [facility] [address] [phone] Please park in [address] or [address] and use [place] [address]oto. Take Medical Office Building elevators.    [facility] Emergency Dept Emergency Medicine Go to  As needed, If symptoms worsen [address] [address] [phone]             Referrals:  Orders Placed This Encounter   Procedures    Ambulatory referral/consult to Urology     Standing Status:   Future     Standing Expiration Date:   4/30/2025     Referral Priority:   Routine     Referral Type:   Consultation     Referral Reason:   Specialty Services Required     Requested Specialty:   Urology     Number of Visits Requested:   1       Disposition   ED Disposition Condition    Discharge Stable            Diagnosis    ICD-10-CM ICD-9-CM   1. Hematuria, unspecified type  R31.9 599.70   2. Nephrolithiasis  N20.0 592.0   3. Dysuria  R30.0 788.1           Carroll Hart MD        03/30/2024          DISCLAIMER: This note was  prepared with [employer] voice recognition transcription software. Garbled syntax, mangled pronouns, and other bizarre constructions may be attributed to that software system.       Carroll Hart MD  03/30/24 0447

## 2024-03-30 NOTE — DISCHARGE INSTRUCTIONS
Toradol has been sent to your pharmacy. You can supplement with tylenol 650 mg every 6-8 hours as well. After the toradol is done, You can take Tylenol 500-650 mg every 8 hours, and ibuprofen 600-800 mg every 8 hours; this means you can take pain medicine once every 4 hours.

## 2024-03-30 NOTE — ED NOTES
Patient identifiers for Eduin Ugarte checked and correct.  LOC: The patient is awake, alert and aware of environment with an appropriate affect, the patient is oriented x 3 and speaking appropriately.  APPEARANCE: Patient resting quietly and in no acute distress, patient is clean and well groomed, patient's clothing are properly fastened.  SKIN: The skin is warm and dry, patient has normal skin turgor and moist mucus membranes.  MUSKULOSKELETAL: Patient moving all extremities well, no obvious swelling or deformities noted.  RESPIRATORY: Airway is open and patent, respirations are spontaneous, patient has a normal effort and rate.

## 2024-03-30 NOTE — ED NOTES
Pt states he had 2 episodes of pink tinged urine. States he has pain when he starts to urinate. Denies any flank pain.

## 2025-04-09 ENCOUNTER — TELEPHONE (OUTPATIENT)
Dept: FAMILY MEDICINE | Facility: CLINIC | Age: 30
End: 2025-04-09
Payer: MEDICAID

## 2025-04-09 NOTE — TELEPHONE ENCOUNTER
Last OV 5/3/2022  Leathaie 2/6/2024    He will fall off panel on 5/3/2025    I called the pt to schedule appt since it has almost been 3 years    FYI - he does not have insurance at this time    I advised him to call back to schedule once he gets ins (he is trying to get ins through his job) and we will get him scheduled